# Patient Record
Sex: FEMALE | URBAN - METROPOLITAN AREA
[De-identification: names, ages, dates, MRNs, and addresses within clinical notes are randomized per-mention and may not be internally consistent; named-entity substitution may affect disease eponyms.]

---

## 2020-02-24 ENCOUNTER — APPOINTMENT (RX ONLY)
Dept: URBAN - METROPOLITAN AREA CLINIC 167 | Facility: CLINIC | Age: 85
Setting detail: DERMATOLOGY
End: 2020-02-24

## 2020-02-24 DIAGNOSIS — H61.03 CHONDRITIS OF EXTERNAL EAR: ICD-10-CM

## 2020-02-24 PROBLEM — D48.5 NEOPLASM OF UNCERTAIN BEHAVIOR OF SKIN: Status: ACTIVE | Noted: 2020-02-24

## 2020-02-24 PROCEDURE — ? COUNSELING

## 2020-02-24 PROCEDURE — 69100 BIOPSY OF EXTERNAL EAR: CPT

## 2020-02-24 PROCEDURE — ? BIOPSY BY SHAVE METHOD

## 2020-02-24 ASSESSMENT — LOCATION DETAILED DESCRIPTION DERM: LOCATION DETAILED: RIGHT SUPERIOR HELIX

## 2020-02-24 ASSESSMENT — LOCATION SIMPLE DESCRIPTION DERM: LOCATION SIMPLE: RIGHT EAR

## 2020-02-24 ASSESSMENT — LOCATION ZONE DERM: LOCATION ZONE: EAR

## 2020-02-24 NOTE — PROCEDURE: BIOPSY BY SHAVE METHOD
Detail Level: Detailed
Depth Of Biopsy: dermis
Was A Bandage Applied: Yes
Size Of Lesion In Cm: 0.3
X Size Of Lesion In Cm: 0
Biopsy Type: H and E
Biopsy Method: double edge Personna blade
Anesthesia Volume In Cc (Will Not Render If 0): 0.5
Hemostasis: Pressure
Wound Care: Vaseline
Dressing: bandage
Destruction After The Procedure: No
Type Of Destruction Used: Curettage
Cryotherapy Text: The wound bed was treated with cryotherapy after the biopsy was performed.
Electrodesiccation Text: The wound bed was treated with electrodesiccation after the biopsy was performed.
Electrodesiccation And Curettage Text: The wound bed was treated with electrodesiccation and curettage after the biopsy was performed.
Silver Nitrate Text: The wound bed was treated with silver nitrate after the biopsy was performed.
Lab: [id number]
Lab Facility: [id number]
Consent: Written consent was obtained and risks were reviewed including but not limited to scarring, infection, bleeding, scabbing, incomplete removal, nerve damage and allergy to anesthesia.
Post-Care Instructions: I reviewed with the patient in detail post-care instructions. Patient is to keep the biopsy site dry overnight, and then apply bacitracin twice daily until healed. Patient may apply hydrogen peroxide soaks to remove any crusting.
Notification Instructions: Patient will be notified of biopsy results. However, patient instructed to call the office if not contacted within 2 weeks.
Billing Type: Third-Party Bill

## 2024-08-18 ENCOUNTER — APPOINTMENT (OUTPATIENT)
Dept: GENERAL RADIOLOGY | Facility: HOSPITAL | Age: 89
End: 2024-08-18
Attending: EMERGENCY MEDICINE
Payer: MEDICARE

## 2024-08-18 ENCOUNTER — HOSPITAL ENCOUNTER (INPATIENT)
Facility: HOSPITAL | Age: 89
LOS: 7 days | Discharge: HOME HEALTH | End: 2024-08-25
Attending: EMERGENCY MEDICINE | Admitting: INTERNAL MEDICINE
Payer: MEDICARE

## 2024-08-18 ENCOUNTER — APPOINTMENT (OUTPATIENT)
Dept: CT IMAGING | Facility: HOSPITAL | Age: 89
End: 2024-08-18
Attending: EMERGENCY MEDICINE
Payer: MEDICARE

## 2024-08-18 DIAGNOSIS — R00.0 SINUS TACHYCARDIA: ICD-10-CM

## 2024-08-18 DIAGNOSIS — D64.9 ANEMIA, UNSPECIFIED TYPE: ICD-10-CM

## 2024-08-18 DIAGNOSIS — R11.2 NAUSEA VOMITING AND DIARRHEA: Primary | ICD-10-CM

## 2024-08-18 DIAGNOSIS — R63.0 POOR APPETITE: ICD-10-CM

## 2024-08-18 DIAGNOSIS — R73.9 HYPERGLYCEMIA: ICD-10-CM

## 2024-08-18 DIAGNOSIS — J90 PLEURAL EFFUSION: ICD-10-CM

## 2024-08-18 DIAGNOSIS — E87.20 LACTIC ACIDOSIS: ICD-10-CM

## 2024-08-18 DIAGNOSIS — R19.7 NAUSEA VOMITING AND DIARRHEA: Primary | ICD-10-CM

## 2024-08-18 DIAGNOSIS — E86.0 DEHYDRATION: ICD-10-CM

## 2024-08-18 DIAGNOSIS — C79.89 MALIGNANT NEOPLASM METASTATIC TO OTHER SITE (HCC): ICD-10-CM

## 2024-08-18 DIAGNOSIS — R74.01 TRANSAMINITIS: ICD-10-CM

## 2024-08-18 DIAGNOSIS — R11.2 NAUSEA AND VOMITING, UNSPECIFIED VOMITING TYPE: ICD-10-CM

## 2024-08-18 DIAGNOSIS — D75.839 THROMBOCYTOSIS: ICD-10-CM

## 2024-08-18 LAB
ALBUMIN SERPL-MCNC: 3.9 G/DL (ref 3.2–4.8)
ALBUMIN/GLOB SERPL: 1.2 {RATIO} (ref 1–2)
ALP LIVER SERPL-CCNC: 790 U/L
ALT SERPL-CCNC: 81 U/L
ANION GAP SERPL CALC-SCNC: 9 MMOL/L (ref 0–18)
APTT PPP: 25.7 SECONDS (ref 23–36)
AST SERPL-CCNC: 108 U/L (ref ?–34)
ATRIAL RATE: 123 BPM
BASOPHILS # BLD AUTO: 0 X10(3) UL (ref 0–0.2)
BASOPHILS NFR BLD AUTO: 0 %
BILIRUB SERPL-MCNC: 0.5 MG/DL (ref 0.2–1.1)
BILIRUB UR QL STRIP.AUTO: NEGATIVE
BUN BLD-MCNC: 22 MG/DL (ref 9–23)
CALCIUM BLD-MCNC: 9.1 MG/DL (ref 8.7–10.4)
CHLORIDE SERPL-SCNC: 101 MMOL/L (ref 98–112)
CLARITY UR REFRACT.AUTO: CLEAR
CO2 SERPL-SCNC: 25 MMOL/L (ref 21–32)
COLOR UR AUTO: YELLOW
CREAT BLD-MCNC: 0.77 MG/DL
EGFRCR SERPLBLD CKD-EPI 2021: 74 ML/MIN/1.73M2 (ref 60–?)
EOSINOPHIL # BLD AUTO: 0 X10(3) UL (ref 0–0.7)
EOSINOPHIL NFR BLD AUTO: 0 %
ERYTHROCYTE [DISTWIDTH] IN BLOOD BY AUTOMATED COUNT: 14.7 %
EST. AVERAGE GLUCOSE BLD GHB EST-MCNC: 117 MG/DL (ref 68–126)
GLOBULIN PLAS-MCNC: 3.2 G/DL (ref 2–3.5)
GLUCOSE BLD-MCNC: 171 MG/DL (ref 70–99)
GLUCOSE UR STRIP.AUTO-MCNC: 100 MG/DL
HBA1C MFR BLD: 5.7 % (ref ?–5.7)
HCT VFR BLD AUTO: 30.2 %
HGB BLD-MCNC: 10.9 G/DL
HYALINE CASTS #/AREA URNS AUTO: PRESENT /LPF
IMM GRANULOCYTES # BLD AUTO: 0.04 X10(3) UL (ref 0–1)
IMM GRANULOCYTES NFR BLD: 0.5 %
INR BLD: 0.99 (ref 0.8–1.2)
KETONES UR STRIP.AUTO-MCNC: 40 MG/DL
LACTATE SERPL-SCNC: 1.6 MMOL/L (ref 0.5–2)
LACTATE SERPL-SCNC: 2.7 MMOL/L (ref 0.5–2)
LEUKOCYTE ESTERASE UR QL STRIP.AUTO: NEGATIVE
LIPASE SERPL-CCNC: 100 U/L (ref 12–53)
LYMPHOCYTES # BLD AUTO: 0.33 X10(3) UL (ref 1–4)
LYMPHOCYTES NFR BLD AUTO: 3.9 %
MCH RBC QN AUTO: 33.7 PG (ref 26–34)
MCHC RBC AUTO-ENTMCNC: 36.1 G/DL (ref 31–37)
MCV RBC AUTO: 93.5 FL
MONOCYTES # BLD AUTO: 0.16 X10(3) UL (ref 0.1–1)
MONOCYTES NFR BLD AUTO: 1.9 %
NEUTROPHILS # BLD AUTO: 7.92 X10 (3) UL (ref 1.5–7.7)
NEUTROPHILS # BLD AUTO: 7.92 X10(3) UL (ref 1.5–7.7)
NEUTROPHILS NFR BLD AUTO: 93.7 %
NITRITE UR QL STRIP.AUTO: NEGATIVE
OSMOLALITY SERPL CALC.SUM OF ELEC: 287 MOSM/KG (ref 275–295)
P AXIS: 85 DEGREES
P-R INTERVAL: 166 MS
PH UR STRIP.AUTO: 6 [PH] (ref 5–8)
PLATELET # BLD AUTO: 467 10(3)UL (ref 150–450)
POTASSIUM SERPL-SCNC: 3.7 MMOL/L (ref 3.5–5.1)
PROT SERPL-MCNC: 7.1 G/DL (ref 5.7–8.2)
PROT UR STRIP.AUTO-MCNC: 200 MG/DL
PROTHROMBIN TIME: 13.1 SECONDS (ref 11.6–14.8)
Q-T INTERVAL: 304 MS
QRS DURATION: 74 MS
QTC CALCULATION (BEZET): 435 MS
R AXIS: 57 DEGREES
RBC # BLD AUTO: 3.23 X10(6)UL
RBC UR QL AUTO: NEGATIVE
SODIUM SERPL-SCNC: 135 MMOL/L (ref 136–145)
SP GR UR STRIP.AUTO: >1.03 (ref 1–1.03)
T AXIS: 18 DEGREES
UROBILINOGEN UR STRIP.AUTO-MCNC: NORMAL MG/DL
VENTRICULAR RATE: 123 BPM
WBC # BLD AUTO: 8.5 X10(3) UL (ref 4–11)

## 2024-08-18 PROCEDURE — 71045 X-RAY EXAM CHEST 1 VIEW: CPT | Performed by: EMERGENCY MEDICINE

## 2024-08-18 PROCEDURE — 74177 CT ABD & PELVIS W/CONTRAST: CPT | Performed by: EMERGENCY MEDICINE

## 2024-08-18 PROCEDURE — 99223 1ST HOSP IP/OBS HIGH 75: CPT | Performed by: HOSPITALIST

## 2024-08-18 RX ORDER — LEVOTHYROXINE SODIUM 100 UG/1
100 TABLET ORAL
COMMUNITY

## 2024-08-18 RX ORDER — DEXTROSE, SODIUM CHLORIDE, SODIUM LACTATE, POTASSIUM CHLORIDE, AND CALCIUM CHLORIDE 5; .6; .31; .03; .02 G/100ML; G/100ML; G/100ML; G/100ML; G/100ML
100 INJECTION, SOLUTION INTRAVENOUS CONTINUOUS
Status: DISCONTINUED | OUTPATIENT
Start: 2024-08-18 | End: 2024-08-18

## 2024-08-18 RX ORDER — FOLIC ACID 1 MG/1
1 TABLET ORAL DAILY
Status: DISCONTINUED | OUTPATIENT
Start: 2024-08-18 | End: 2024-08-25

## 2024-08-18 RX ORDER — SODIUM PHOSPHATE, DIBASIC AND SODIUM PHOSPHATE, MONOBASIC 7; 19 G/230ML; G/230ML
1 ENEMA RECTAL ONCE AS NEEDED
Status: DISCONTINUED | OUTPATIENT
Start: 2024-08-18 | End: 2024-08-25

## 2024-08-18 RX ORDER — PANTOPRAZOLE SODIUM 40 MG/1
40 TABLET, DELAYED RELEASE ORAL
Status: DISCONTINUED | OUTPATIENT
Start: 2024-08-19 | End: 2024-08-19

## 2024-08-18 RX ORDER — ACETAMINOPHEN 500 MG
1000 TABLET ORAL EVERY 4 HOURS PRN
Status: DISCONTINUED | OUTPATIENT
Start: 2024-08-18 | End: 2024-08-25

## 2024-08-18 RX ORDER — FOLIC ACID 1 MG/1
TABLET ORAL DAILY
COMMUNITY

## 2024-08-18 RX ORDER — LEVOTHYROXINE SODIUM 100 UG/1
100 TABLET ORAL
Status: DISCONTINUED | OUTPATIENT
Start: 2024-08-18 | End: 2024-08-25

## 2024-08-18 RX ORDER — MORPHINE SULFATE 4 MG/ML
4 INJECTION, SOLUTION INTRAMUSCULAR; INTRAVENOUS ONCE
Status: COMPLETED | OUTPATIENT
Start: 2024-08-18 | End: 2024-08-18

## 2024-08-18 RX ORDER — ONDANSETRON 2 MG/ML
4 INJECTION INTRAMUSCULAR; INTRAVENOUS ONCE
Status: COMPLETED | OUTPATIENT
Start: 2024-08-18 | End: 2024-08-18

## 2024-08-18 RX ORDER — METOCLOPRAMIDE HYDROCHLORIDE 5 MG/ML
10 INJECTION INTRAMUSCULAR; INTRAVENOUS EVERY 8 HOURS PRN
Status: DISCONTINUED | OUTPATIENT
Start: 2024-08-18 | End: 2024-08-25

## 2024-08-18 RX ORDER — ONDANSETRON 2 MG/ML
4 INJECTION INTRAMUSCULAR; INTRAVENOUS EVERY 6 HOURS PRN
Status: DISCONTINUED | OUTPATIENT
Start: 2024-08-18 | End: 2024-08-25

## 2024-08-18 RX ORDER — SENNOSIDES 8.6 MG
17.2 TABLET ORAL NIGHTLY PRN
Status: DISCONTINUED | OUTPATIENT
Start: 2024-08-18 | End: 2024-08-25

## 2024-08-18 RX ORDER — ECHINACEA PURPUREA EXTRACT 125 MG
1 TABLET ORAL
Status: DISCONTINUED | OUTPATIENT
Start: 2024-08-18 | End: 2024-08-25

## 2024-08-18 RX ORDER — ENOXAPARIN SODIUM 100 MG/ML
40 INJECTION SUBCUTANEOUS DAILY
Status: DISCONTINUED | OUTPATIENT
Start: 2024-08-18 | End: 2024-08-19

## 2024-08-18 RX ORDER — POLYETHYLENE GLYCOL 3350 17 G/17G
17 POWDER, FOR SOLUTION ORAL DAILY PRN
Status: DISCONTINUED | OUTPATIENT
Start: 2024-08-18 | End: 2024-08-25

## 2024-08-18 RX ORDER — MELATONIN
3 NIGHTLY PRN
Status: DISCONTINUED | OUTPATIENT
Start: 2024-08-18 | End: 2024-08-25

## 2024-08-18 RX ORDER — BISACODYL 10 MG
10 SUPPOSITORY, RECTAL RECTAL
Status: DISCONTINUED | OUTPATIENT
Start: 2024-08-18 | End: 2024-08-25

## 2024-08-18 RX ORDER — DEXTROSE, SODIUM CHLORIDE, SODIUM LACTATE, POTASSIUM CHLORIDE, AND CALCIUM CHLORIDE 5; .6; .31; .03; .02 G/100ML; G/100ML; G/100ML; G/100ML; G/100ML
100 INJECTION, SOLUTION INTRAVENOUS CONTINUOUS
Status: ACTIVE | OUTPATIENT
Start: 2024-08-18 | End: 2024-08-18

## 2024-08-18 NOTE — ED QUICK NOTES
Patient reports her pain has significantly improved and here nausea has improved. Per patient she is comfortable at this time.

## 2024-08-18 NOTE — ED INITIAL ASSESSMENT (HPI)
States she has had n/v/d since yesterday.  Pain in lower abd.  Pt has just recently had surgery over the past 5 weeks to repair an ulcer and during surgery they found her breast cancer had spread to her liver. Denies any blood in stool or emesis. States she just wants relief from the vomiting.

## 2024-08-18 NOTE — ED QUICK NOTES
Orders for admission, patient is aware of plan and ready to go upstairs. Any questions, please call ED RN Guillermina at extension [phone].     Patient Covid vaccination status: Unvaccinated     COVID Test Ordered in ED: None    COVID Suspicion at Admission: N/A    Running Infusions:      Mental Status/LOC at time of transport: A&Ox4    Other pertinent information:   CIWA score: N/A   NIH score:  N/A

## 2024-08-18 NOTE — PLAN OF CARE
Admitted this 88y/o female w/ chief complaints of nausea,vomiting & having frequent soft/loose stools at home. PMHx includes recent repair of perforated duodenal ulcer, thyroid dse, R mastectomy,R breast cancer ,currently on palliative chemo,hyperlipidemia.  Recent findings of liver mets.IV antibiotics for possible enterocolitis & poss PNA was given in ED, IV fluids & antiemetics. Patient denies having pain & nause upon transfer from ED. She was placed comfortably in bed.Admission data completed. Patient & family updated of plan of care.Oriented to room & unit set up.Call light placed in reach.

## 2024-08-18 NOTE — ED QUICK NOTES
Pt is resting calmly in bed. Family rpts that the pt is more comfortable. Family updated on POC. Pt is waiting to go to CT.

## 2024-08-18 NOTE — ED PROVIDER NOTES
Patient Seen in: [facility] Emergency Department      History     Chief Complaint   Patient presents with    Vomiting     Stated Complaint: VOMITING    Subjective:   89-year-old female, presents with nausea and vomiting.  Patient states the past month or so she was admitted to [facility] for nausea and vomiting, discovered that she has a metastatic liver mass from her known stage IV breast cancer.  Says she also had a duodenal ulcer perforation required surgery for that.  States she was doing okay until last night when started getting really nauseous and cannot keep much p.o. down.  States she has had some loose stools with nausea and vomiting all night and came in for evaluation.  No chest pain cough or shortness of breath.  No blood in her stools.  No dysuria hematuria.  No fevers.  Patient did take oral forecasted yesterday.  This is for low levels.  Also had her last heparin subcu injections yesterday which she has been on since her surgery about 6 weeks ago    EPIC MS NOTE:  Andree Joshi is a 89YO female Patient was admitted 7/10/2024 for abdominal pain, Nausea and inability to tolerate po.  This was felt to be due to hepatic metastasis so she was being treated for this and Hyponatremia.  On 7/20/24 she had severe abdominal pain and was found to have a perforated dudodenal ulcer.  She was taken to the OR on 7/20/24 for Jeff Patch repair of perforated ulcer.  Postoperative course was complicated by slow return of bowel function.  Treatment included Surgery, IV hydration, diet advancement, IV/PO analgesics and antiemetics, DVT and PUD prophylaxis.   Patient discharged to home on 7/30/24 .  At time of discharge, she is hemodynamically stable, afebrile, tolerating a gen diet, ambulating, voiding spontaneously, and pain is controlled on oral medications.  Patient will follow up with Dr. Dobbins in 2 weeks.            Objective:   Past Medical History:    Cancer (HCC)    Hyperlipidemia    Thyroid  disease              Past Surgical History:   Procedure Laterality Date    Abdominal surgery      Mastectomy                  Social History     Socioeconomic History    Marital status:    Tobacco Use    Smoking status: Never    Smokeless tobacco: Never   Vaping Use    Vaping status: Never Used   Substance and Sexual Activity    Alcohol use: Not Currently    Drug use: Never     Social Determinants of Health     Financial Resource Strain: Low Risk  (7/27/2024)    Received from [facility]    Overall Financial Resource Strain (CARDIA)     Difficulty of Paying Living Expenses: Not hard at all   Food Insecurity: No Food Insecurity (7/27/2024)    Received from [facility]    Hunger Vital Sign     Worried About Running Out of Food in the Last Year: Never true     Ran Out of Food in the Last Year: Never true   Transportation Needs: No Transportation Needs (7/27/2024)    Received from [facility]    PRAPARE - Transportation     Lack of Transportation (Medical): No     Lack of Transportation (Non-Medical): No   Physical Activity: Sufficiently Active (11/29/2022)    Received from [facility]    Exercise Vital Sign     Days of Exercise per Week: 7 days     Minutes of Exercise per Session: 90 min   Stress: No Stress Concern Present (11/29/2022)    Received from [facility]    [place] citizen [place] of Occupational [employer] - Occupational Stress Questionnaire     Feeling of Stress : Only a little   Social Connections: Moderately Integrated (11/29/2022)    Received from [facility]    Social Connection and Isolation Panel [NHANES]     Frequency of Communication with Friends and Family: More than three times a week     Frequency of Social Gatherings with Friends and Family: More than three times a week     Attends Jain Services: Never     Active Member of Clubs or Organizations: Yes     Attends Club or Organization Meetings: More than 4 times per year      Marital Status:    Housing Stability: Unknown (7/27/2024)    Received from [facility]    Housing Stability Vital Sign     Unable to Pay for Housing in the Last Year: Patient declined     Number of Times Moved in the Last Year: 1     Homeless in the Last Year: No              Review of Systems   Constitutional:  Negative for fever.   Respiratory:  Negative for shortness of breath.    Cardiovascular:  Negative for chest pain.   Gastrointestinal:  Positive for abdominal pain, diarrhea, nausea and vomiting.   Genitourinary:  Negative for dysuria.   Musculoskeletal:  Positive for back pain.   Neurological:  Negative for dizziness and headaches.       Positive for stated Chief Complaint: Vomiting    Other systems are as noted in HPI.  Constitutional and vital signs reviewed.      All other systems reviewed and negative except as noted above.    Physical Exam     ED Triage Vitals [08/18/24 0602]   BP (!) 172/79   Pulse 117   Resp 20   Temp 98.9 °F (37.2 °C)   Temp src Temporal   SpO2 92 %   O2 Device None (Room air)       Current Vitals:   Vital Signs  BP: 125/71  Pulse: 94  Resp: (!) 28  Temp: 98.9 °F (37.2 °C)  Temp src: Temporal  MAP (mmHg): 88    Oxygen Therapy  SpO2: 93 %  O2 Device: Nasal cannula  O2 Flow Rate (L/min): 2 L/min            Physical Exam  Vitals and nursing note reviewed.   HENT:      Head: Normocephalic.      Nose: Nose normal.      Mouth/Throat:      Mouth: Mucous membranes are moist.   Cardiovascular:      Rate and Rhythm: Tachycardia present. Rhythm irregular.      Pulses: Normal pulses.   Pulmonary:      Effort: Pulmonary effort is normal. No respiratory distress.      Breath sounds: Normal breath sounds.   Abdominal:      General: Bowel sounds are normal. There is no distension.      Palpations: Abdomen is soft.      Comments: Mild diffuse tenderness without rebound or guarding.  Ex lap scar   Musculoskeletal:      Cervical back: Neck supple.   Skin:     General:  Skin is warm and dry.   Neurological:      General: No focal deficit present.      Mental Status: She is alert.   Psychiatric:         Mood and Affect: Mood normal.               ED Course     Labs Reviewed   COMP METABOLIC PANEL (14) - Abnormal; Notable for the following components:       Result Value    Glucose 171 (*)     Sodium 135 (*)      (*)     ALT 81 (*)     Alkaline Phosphatase 790 (*)     All other components within normal limits   CBC WITH DIFFERENTIAL WITH PLATELET - Abnormal; Notable for the following components:    RBC 3.23 (*)     HGB 10.9 (*)     HCT 30.2 (*)     .0 (*)     Neutrophil Absolute Prelim 7.92 (*)     Neutrophil Absolute 7.92 (*)     Lymphocyte Absolute 0.33 (*)     All other components within normal limits   LACTIC ACID, PLASMA - Abnormal; Notable for the following components:    Lactic Acid 2.7 (*)     All other components within normal limits   LIPASE - Abnormal; Notable for the following components:    Lipase 100 (*)     All other components within normal limits   PROTHROMBIN TIME (PT) - Normal   PTT, ACTIVATED - Normal   URINALYSIS WITH CULTURE REFLEX   LACTIC ACID REFLEX POST POSTIVE   BLOOD CULTURE   BLOOD CULTURE   C. DIFFICILE(TOXIGENIC)PCR   GI STOOL PANEL BY PCR     EKG    Rate, intervals and axes as noted on EKG Report.  Rate: 123  Rhythm: Sinus Rhythm  Reading: EKG sinus tachycardia 123 bpm.  Normal axis.  Sinus arrhythmia noted.  No ST elevations.  No previous EKGs to view to compare to    Patient placed on cardiac monitor for telemetry monitoring secondary to abd pain, n/v. Interpretation at bedside by me is sinus tachycardia.                        MDM      CT ABDOMEN+PELVIS(CONTRAST ONLY)(CPT=74177)    Result Date: 8/18/2024  CONCLUSION:   1. Numerous scattered hepatic masses noted which is concerning for metastatic disease.  The largest mass spans the left and right hepatic lobes measuring up to 10.2 x 7.3 cm.  2. There is diffuse wall thickening of the  large and small bowel with underlying nonspecific enterocolitis or other etiologies not entirely excluded.  Mild-to-moderate diffuse abdominal/pelvic ascites.  No free air.  3. Atelectasis/consolidation in the lower lungs with underlying pneumonia not excluded.  Clinical correlation recommended.  Partially imaged bilateral pleural effusions.  4. Cholelithiasis.  5. Anasarca.   Please see above for further details.  Case discussed with Dr. Fernandez at 0804 hours on 8/18/2024.  LOCATION:  [facility]   Dictated by (CST): Papito Oneal MD on 8/18/2024 at 7:54 AM     Finalized by (CST): Papito Oneal MD on 8/18/2024 at 8:07 AM       XR CHEST AP PORTABLE  (CPT=71045)    Result Date: 8/18/2024  CONCLUSION:  Small left pleural effusion.  Atelectasis/consolidation in the left lower lobe with underlying pneumonia not excluded.  Clinical correlation and follow-up suggested.  Mild atelectasis/scarring at the right lung base.    LOCATION:  [facility]      Dictated by (CST): Papito Oneal MD on 8/18/2024 at 7:07 AM     Finalized by (CST): Papito Oneal MD on 8/18/2024 at 7:08 AM        I independent interpreted the CT abdomen pelvis without any obvious signs of obstructive process    Family at bedside helpful to provide information on the history presenting illness    External chart review demonstrates her recent admission to [place] for her duodenal perforation, nausea and vomiting    Differential diagnosis includes, but not limited to, infection, dehydration, metastatic disease, bowel perforation, obstruction    89-year-old female with nausea and vomiting.  Tachycardic care upon arrival.  Has bilateral pleural effusions, ascites and anasarca.  Broad-spectrum antibiotic coverage.  Stool cultures ordered and pending.  Should her pain and nausea and vomiting controlled with morphine and Zofran here.  Admitted Dr. Jaime, awaiting bed assignment.  Heart rate improved.  Blood pressure improved.          Admission disposition:  8/18/2024  9:48 AM                                     [facility]   part of [facility]      Sepsis Reassessment Note    BP (!) 172/79   Pulse 117   Temp 98.9 °F (37.2 °C) (Temporal)   Resp 20   Ht 165.1 cm (5' 5\")   Wt 57.6 kg   SpO2 92%   BMI 21.13 kg/m²      I completed the sepsis reassessment at 0920    Cardiac:  Regularity: Regular  Rate: Tachycardic  Heart Sounds: S1,S2    Lungs:   Right: Clear  Left: Clear    Peripheral Pulses:  Radial: Right 1+ or Left 1+      Capillary Refill:  <3 Secs    Skin:  Temp/Moisture: Warm and Dry  Color: Normal      Sd Fernandez DO  8/18/2024  6:43 AM            Medical Decision Making      Disposition and Plan     Clinical Impression:  1. Nausea vomiting and diarrhea    2. Lactic acidosis    3. Malignant neoplasm metastatic to other site (HCC)    4. Pleural effusion    5. Sinus tachycardia    6. Dehydration    7. Transaminitis    8. Hyperglycemia    9. Thrombocytosis    10. Anemia, unspecified type         Disposition:  Admit  8/18/2024  9:48 am    Follow-up:  No follow-up provider specified.        Medications Prescribed:  Current Discharge Medication List                            Hospital Problems       Present on Admission             ICD-10-CM Noted POA    * (Principal) Nausea vomiting and diarrhea R11.2, R19.7 8/18/2024 Unknown

## 2024-08-18 NOTE — H&P
[facility]     Andree Joshi Patient Status:  Inpatient    3/1/1935 MRN [id number]   Location [facility] 4NW-A Attending Joel Jaime MD   Hosp Day # 0 PCP Gena Rivas     Chief Complaint:   Chief Complaint   Patient presents with    Vomiting       Subjective:    History of Present Illness:     Andree Joshi is a 89 year old female with a past medical history of osteoporosis, hypothyroidism, hyperlipidemia and right breast cancer.  She has stage IV breast cancer and is currently on palliative therapy.  She was recently admitted at an outside hospital secondary to nausea vomiting and at that time was found to have liver mets.  She also recently had a duodenal ulcer perforation that required surgery.  She has been on a PPI.  She now comes to the emergency room secondary to significant nausea and some vomiting.  She states that she is also had some loose stool.  She denies any other chest pain or shortness of breath at this time.  She recently completed her subcutaneous heparin injections as she was on secondary to her surgery which is about 6 weeks ago.  In emergency room she had an abdominal CT scan that showed hepatic mets and wall thickening in the large and small bowel with possible enterocolitis.  She also has signs of possible low lung pneumonia.  Lactic acid was elevated.    History/Other:    Past Medical History:  Past Medical History:    Cancer (HCC)    History of stomach ulcers    Hyperlipidemia    Thyroid disease     Past Surgical History:   Past Surgical History:   Procedure Laterality Date    Abdominal surgery      Mastectomy      Mastectomy right        Family History:   Family History   Problem Relation Age of Onset    Other (Other) Father     Cancer Father      Social History:    reports that she has never smoked. She has never used smokeless tobacco. She reports that she does not currently use alcohol. She reports that she does not use drugs.      Allergies: No Known Allergies    Medications:    No current facility-administered medications on file prior to encounter.     Current Outpatient Medications on File Prior to Encounter   Medication Sig Dispense Refill    levothyroxine 100 MCG Oral Tab Take 1 tablet (100 mcg total) by mouth before breakfast.      folic acid 1 MG Oral Tab Take by mouth daily.         Review of Systems:   A comprehensive review of systems was completed.    Pertinent positives and negatives noted in the HPI.    Objective:   Physical Exam:    /71   Pulse 94   Temp 98.9 °F (37.2 °C) (Temporal)   Resp (!) 28   Ht 5' 5\" (1.651 m)   Wt 127 lb (57.6 kg)   SpO2 93%   BMI 21.13 kg/m²   General: No acute distress, Alert  Respiratory: mild basilar rhonchi, no wheezes  Cardiovascular: S1, S2.   Abdomen: Soft, non tender, +BS  Neuro: No new focal deficits  Extremities: No edema      Results:    Labs:      Labs Last 24 Hours:  Recent Labs   Lab 08/18/24  0631   WBC 8.5   HGB 10.9*   MCV 93.5   .0*   INR 0.99       Recent Labs   Lab 08/18/24  0631   *   BUN 22   CREATSERUM 0.77   CA 9.1   ALB 3.9   *   K 3.7      CO2 25.0   ALKPHO 790*   *   ALT 81*   BILT 0.5   TP 7.1       Estimated Creatinine Clearance: 45.4 mL/min (based on SCr of 0.77 mg/dL).    No results for input(s): \"TROP\", \"TROPHS\", \"CK\" in the last 168 hours.    Recent Labs   Lab 08/18/24  0631   PTP 13.1   INR 0.99       No results for input(s): \"TROP\", \"CK\" in the last 168 hours.      Imaging: Imaging data reviewed in Epic.    Assessment & Plan:      #Sepsis  BCX  Minimize IVF given risk of fluid OL    #PNA  IV ABX    #N/V  Possible enterocolitis  Minimal diarrhea  R/o c. Diff  GI stool panel  Supportive care  CLD for now    #Metastatic Stage IV BRCA  Follows with onc as OP  On palliative therapy    #Hyperglycemia  Likely stress/infection related  A1c    #Transaminase elevation  Due to liver  mets  monitor    #Hypothyroid    #AOCD    #Recent perforated duodenal ulcer/PUD  Resume PPI      All diagnosis' and recommendations discussed with patient and/or family in detail.      Plan of care discussed with ED physician      Joel Jaime MD    Supplementary Documentation:     The 21st Century Cures Act makes medical notes like these available to patients in the interest of transparency. Please be advised this is a medical document. Medical documents are intended to carry relevant information, facts as evident, and the clinical opinion of the practitioner. The medical note is intended as peer to peer communication and may appear blunt or direct. It is written in medical language and may contain abbreviations or verbiage that are unfamiliar.               **Certification      PHYSICIAN Certification of Need for Inpatient Hospitalization - Initial Certification    Patient will require inpatient services that will reasonably be expected to span two midnight's based on the clinical documentation in H+P.   Based on patients current state of illness, I anticipate that, after discharge, patient will require TBD.

## 2024-08-18 NOTE — ED QUICK NOTES
Patient sating 87% on room air, patient no resp distress. Patient placed on 2L nc and sating 95%.

## 2024-08-19 ENCOUNTER — ANESTHESIA EVENT (OUTPATIENT)
Dept: ENDOSCOPY | Facility: HOSPITAL | Age: 89
End: 2024-08-19
Payer: MEDICARE

## 2024-08-19 ENCOUNTER — APPOINTMENT (OUTPATIENT)
Dept: MRI IMAGING | Facility: HOSPITAL | Age: 89
End: 2024-08-19
Attending: INTERNAL MEDICINE
Payer: MEDICARE

## 2024-08-19 DIAGNOSIS — R11.2 NAUSEA AND VOMITING, UNSPECIFIED VOMITING TYPE: Primary | ICD-10-CM

## 2024-08-19 PROBLEM — K29.00 ACUTE GASTRITIS WITHOUT HEMORRHAGE: Status: ACTIVE | Noted: 2024-08-19

## 2024-08-19 PROBLEM — Z17.0 MALIGNANT NEOPLASM OF BREAST IN FEMALE, ESTROGEN RECEPTOR POSITIVE (HCC): Status: ACTIVE | Noted: 2024-08-19

## 2024-08-19 PROBLEM — J15.9 PNEUMONIA, BACTERIAL: Status: ACTIVE | Noted: 2024-08-19

## 2024-08-19 PROBLEM — C50.919 MALIGNANT NEOPLASM OF BREAST IN FEMALE, ESTROGEN RECEPTOR POSITIVE (HCC): Status: ACTIVE | Noted: 2024-08-19

## 2024-08-19 LAB
ALBUMIN SERPL-MCNC: 3.1 G/DL (ref 3.2–4.8)
ALBUMIN/GLOB SERPL: 1.3 {RATIO} (ref 1–2)
ALP LIVER SERPL-CCNC: 548 U/L
ALT SERPL-CCNC: 56 U/L
ANION GAP SERPL CALC-SCNC: 3 MMOL/L (ref 0–18)
AST SERPL-CCNC: 76 U/L (ref ?–34)
BASOPHILS # BLD AUTO: 0.01 X10(3) UL (ref 0–0.2)
BASOPHILS # BLD AUTO: 0.01 X10(3) UL (ref 0–0.2)
BASOPHILS NFR BLD AUTO: 0.1 %
BASOPHILS NFR BLD AUTO: 0.1 %
BILIRUB SERPL-MCNC: 0.3 MG/DL (ref 0.2–1.1)
BUN BLD-MCNC: 17 MG/DL (ref 9–23)
CALCIUM BLD-MCNC: 7.8 MG/DL (ref 8.7–10.4)
CHLORIDE SERPL-SCNC: 105 MMOL/L (ref 98–112)
CO2 SERPL-SCNC: 25 MMOL/L (ref 21–32)
CREAT BLD-MCNC: 0.56 MG/DL
EGFRCR SERPLBLD CKD-EPI 2021: 88 ML/MIN/1.73M2 (ref 60–?)
EOSINOPHIL # BLD AUTO: 0 X10(3) UL (ref 0–0.7)
EOSINOPHIL # BLD AUTO: 0 X10(3) UL (ref 0–0.7)
EOSINOPHIL NFR BLD AUTO: 0 %
EOSINOPHIL NFR BLD AUTO: 0 %
ERYTHROCYTE [DISTWIDTH] IN BLOOD BY AUTOMATED COUNT: 14.6 %
ERYTHROCYTE [DISTWIDTH] IN BLOOD BY AUTOMATED COUNT: 15.3 %
GLOBULIN PLAS-MCNC: 2.4 G/DL (ref 2–3.5)
GLUCOSE BLD-MCNC: 155 MG/DL (ref 70–99)
HCT VFR BLD AUTO: 24.9 %
HCT VFR BLD AUTO: 25.1 %
HGB BLD-MCNC: 8.3 G/DL
HGB BLD-MCNC: 8.9 G/DL
IMM GRANULOCYTES # BLD AUTO: 0.06 X10(3) UL (ref 0–1)
IMM GRANULOCYTES # BLD AUTO: 0.06 X10(3) UL (ref 0–1)
IMM GRANULOCYTES NFR BLD: 0.5 %
IMM GRANULOCYTES NFR BLD: 0.5 %
LYMPHOCYTES # BLD AUTO: 0.4 X10(3) UL (ref 1–4)
LYMPHOCYTES # BLD AUTO: 0.5 X10(3) UL (ref 1–4)
LYMPHOCYTES NFR BLD AUTO: 3.4 %
LYMPHOCYTES NFR BLD AUTO: 4.1 %
MAGNESIUM SERPL-MCNC: 1.3 MG/DL (ref 1.6–2.6)
MCH RBC QN AUTO: 33.2 PG (ref 26–34)
MCH RBC QN AUTO: 33.3 PG (ref 26–34)
MCHC RBC AUTO-ENTMCNC: 33.1 G/DL (ref 31–37)
MCHC RBC AUTO-ENTMCNC: 35.7 G/DL (ref 31–37)
MCV RBC AUTO: 100.4 FL
MCV RBC AUTO: 93.3 FL
MONOCYTES # BLD AUTO: 0.49 X10(3) UL (ref 0.1–1)
MONOCYTES # BLD AUTO: 0.5 X10(3) UL (ref 0.1–1)
MONOCYTES NFR BLD AUTO: 4 %
MONOCYTES NFR BLD AUTO: 4.2 %
NEUTROPHILS # BLD AUTO: 10.9 X10 (3) UL (ref 1.5–7.7)
NEUTROPHILS # BLD AUTO: 10.9 X10(3) UL (ref 1.5–7.7)
NEUTROPHILS # BLD AUTO: 11.1 X10 (3) UL (ref 1.5–7.7)
NEUTROPHILS # BLD AUTO: 11.1 X10(3) UL (ref 1.5–7.7)
NEUTROPHILS NFR BLD AUTO: 91.3 %
NEUTROPHILS NFR BLD AUTO: 91.8 %
OSMOLALITY SERPL CALC.SUM OF ELEC: 281 MOSM/KG (ref 275–295)
PLATELET # BLD AUTO: 339 10(3)UL (ref 150–450)
PLATELET # BLD AUTO: 341 10(3)UL (ref 150–450)
POTASSIUM SERPL-SCNC: 3.3 MMOL/L (ref 3.5–5.1)
PROT SERPL-MCNC: 5.5 G/DL (ref 5.7–8.2)
RBC # BLD AUTO: 2.5 X10(6)UL
RBC # BLD AUTO: 2.67 X10(6)UL
SODIUM SERPL-SCNC: 133 MMOL/L (ref 136–145)
WBC # BLD AUTO: 11.9 X10(3) UL (ref 4–11)
WBC # BLD AUTO: 12.2 X10(3) UL (ref 4–11)

## 2024-08-19 PROCEDURE — 99223 1ST HOSP IP/OBS HIGH 75: CPT | Performed by: INTERNAL MEDICINE

## 2024-08-19 PROCEDURE — 99233 SBSQ HOSP IP/OBS HIGH 50: CPT | Performed by: HOSPITALIST

## 2024-08-19 PROCEDURE — 70553 MRI BRAIN STEM W/O & W/DYE: CPT | Performed by: INTERNAL MEDICINE

## 2024-08-19 RX ORDER — MAGNESIUM HYDROXIDE/ALUMINUM HYDROXICE/SIMETHICONE 120; 1200; 1200 MG/30ML; MG/30ML; MG/30ML
30 SUSPENSION ORAL 4 TIMES DAILY PRN
Status: DISCONTINUED | OUTPATIENT
Start: 2024-08-19 | End: 2024-08-25

## 2024-08-19 RX ORDER — POTASSIUM CHLORIDE 1.5 G/1.58G
40 POWDER, FOR SOLUTION ORAL ONCE
Status: COMPLETED | OUTPATIENT
Start: 2024-08-19 | End: 2024-08-19

## 2024-08-19 RX ORDER — AZITHROMYCIN 250 MG/1
500 TABLET, FILM COATED ORAL
Status: COMPLETED | OUTPATIENT
Start: 2024-08-19 | End: 2024-08-20

## 2024-08-19 RX ORDER — GADOTERATE MEGLUMINE 376.9 MG/ML
20 INJECTION INTRAVENOUS
Status: COMPLETED | OUTPATIENT
Start: 2024-08-19 | End: 2024-08-19

## 2024-08-19 RX ORDER — MAGNESIUM OXIDE 400 MG/1
800 TABLET ORAL ONCE
Status: COMPLETED | OUTPATIENT
Start: 2024-08-19 | End: 2024-08-19

## 2024-08-19 NOTE — CONSULTS
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    - recently had prolonged hospitalization with perforated duodenal ulcer s/p exploratory laparotomy and repair on 7/20/24, and needed surgical drains as well  - since discharge reports ongoing n/v, ongoing weight loss  - there was plan to add ribociclib when she recovered more from surgery  - CT A/P with diffuse hepatic metastatic disease; largest mass 10.2x7.3cm. diffuse wall thickening of large and small bowel. Prior CT in 7/2024 noted largest lesion at 9.0x8.8cm.   - living with daughter now in [place], but getting treatment with Dr Desir at [facility]  - denies any abdominal pain  - she is wondering about her decline and is asking appropriate questions about her prognosis. She still takes care of her ADLs but says she has been feeling very weak      Past Medical History:  Past Medical History:    Cancer (HCC)    History of stomach ulcers    Hyperlipidemia    Thyroid disease       Past Surgical History:   Procedure Laterality Date    Abdominal surgery      Mastectomy      Mastectomy right         Home Medications:  No current facility-administered medications on file prior to encounter.     Current Outpatient Medications on File Prior to Encounter   Medication Sig Dispense Refill    levothyroxine 100 MCG Oral Tab Take 1 tablet (100 mcg total) by mouth before breakfast.      folic acid 1 MG Oral Tab Take by mouth daily.         Current Inpatient Medications:  Inpatient Meds:   pantoprazole  40 mg Intravenous Q12H    azithromycin   500 mg Oral Daily    folic acid  1 mg Oral Daily    levothyroxine  100 mcg Oral Before breakfast    ampicillin-sulbactam  3 g Intravenous q6h      potassium chloride 10 mEq in dextrose 5%-sodium chloride 0.9% 1,000 mL infusion 50 mL/hr at 08/19/24 1036       PRN Meds:    alum-mag hydroxide-simethicone    acetaminophen    melatonin    glycerin-hypromellose-    sodium chloride    ondansetron    metoclopramide    polyethylene glycol (PEG 3350)    sennosides    bisacodyl    fleet enema    Allergies:   No Known Allergies    Psychosocial History:  Social History     Social History Narrative    Not on file     Social History     Socioeconomic History    Marital status:    Tobacco Use    Smoking status: Never    Smokeless tobacco: Never   Vaping Use    Vaping status: Never Used   Substance and Sexual Activity    Alcohol use: Not Currently    Drug use: Never     Social Determinants of Health     Financial Resource Strain: Low Risk  (7/27/2024)    Received from [facility]    Overall Financial Resource Strain (CARDIA)     Difficulty of Paying Living Expenses: Not hard at all   Food Insecurity: No Food Insecurity (8/18/2024)    Food Insecurity     Food Insecurity: Never true   Transportation Needs: No Transportation Needs (8/18/2024)    Transportation Needs     Lack of Transportation: No   Physical Activity: Sufficiently Active (11/29/2022)    Received from [facility]    Exercise Vital Sign     Days of Exercise per Week: 7 days     Minutes of Exercise per Session: 90 min   Stress: No Stress Concern Present (11/29/2022)    Received from [facility]    [place] [employer] - Occupational Stress Questionnaire     Feeling of Stress : Only a little   Social Connections: Moderately Integrated (11/29/2022)    Received from [facility]    Social Connection and Isolation Panel [NHANES]     Frequency of Communication with Friends and Family: More than three times a week      Frequency of Social Gatherings with Friends and Family: More than three times a week     Attends Sikhism Services: Never     Active Member of Clubs or Organizations: Yes     Attends Club or Organization Meetings: More than 4 times per year     Marital Status:    Housing Stability: Low Risk  (8/18/2024)    Housing Stability     Housing Instability: No       Family Medical History:  Family History   Problem Relation Age of Onset    Other (Other) Father     Cancer Father        Review of Systems:  A 10-point ROS was done with pertinent positives and negative per the HPI    Vital Signs:  Height: --  Weight: --  BSA (Calculated - sq m): --  Pulse: 94 (08/19 0530)  BP: 141/56 (08/19 0530)  Temp: 98.4 °F (36.9 °C) (08/19 1523)  Do Not Use - Resp Rate: --  SpO2: 96 % (08/19 1523)      Wt Readings from Last 6 Encounters:   08/18/24 57.6 kg (127 lb)       ECOG PS: 1-2    Physical Examination:  General: Patient is alert and oriented, not in acute distress  Psych: Mood and affect are appropriate  Eyes: EOMI, PERRL  ENT: Oropharynx is clear, no adenopathy  CV:no LE edema  Respiratory: Non-labored respirations  GI/Abd: Soft, non-tender, non-distended  Neurological: Grossly intact   Skin: no rashes or petechiae    Laboratory:  Recent Labs   Lab 08/18/24  0631 08/19/24  0615 08/19/24  1255   WBC 8.5 11.9* 12.2*   HGB 10.9* 8.9* 8.3*   HCT 30.2* 24.9* 25.1*   .0* 341.0 339.0   MCV 93.5 93.3 100.4*   RDW 14.7 14.6 15.3   NEPRELIM 7.92* 10.90* 11.10*       Recent Labs   Lab 08/18/24  0631 08/19/24  0615   * 133*   K 3.7 3.3*    105   CO2 25.0 25.0   BUN 22 17   CREATSERUM 0.77 0.56   * 155*   CA 9.1 7.8*   TP 7.1 5.5*   ALB 3.9 3.1*   ALKPHO 790* 548*   * 76*   ALT 81* 56*   BILT 0.5 0.3       Recent Labs   Lab 08/18/24  0631   INR 0.99   PTT 25.7       Impression & Plan:     Metastatic breast cancer to liver  Nausea  - prior treatment history reviewed; had progression on letrozole  - by  report, hepatic disease appears to be stable but no direct comparison by imaging available  - obtain brain MRI to rule out intracranial metastases as cause of n/v  - GI consulted; agree with upper endoscopy to evaluate recent duodenal repair  - if above workup negative, nausea may be SE of fulvestrant vs caused by her hepatic metastatic disease vs enterocolitis. Enterocolitis much less likely caused by fulvestrant  - we discussed her incurable prognosis with palliative intent of treatment. We discussed treatment vs hospice. She is asking appropriate questions about hospice. I discussed philosophy of hospice and ability to do home hospice. Discussed we could proceed with EGD and brain MRI and this would be an ongoing discussion in the setting of her declining functional status with diffuse disease in her liver.    Anemia  - iron studies consistent with anemia of chronic disease. Has folic acid deficiency; continue folic acid 1mg daily    Advance care planning  - we discussed code status. She endorsed DNAR/select. Order entered in chart    Will continue to follow    Olayinka Dejesus MD  Hematology/Medical Oncology  [facility]

## 2024-08-19 NOTE — PLAN OF CARE
Patient is alert and oriented x 4. Family present at the bedside throughout shift. Patient is calm and cooperative with care. No s/s of distress noted. Patient denies pain throughout shift. VSS. On IVF for electrolye/fluid balance as well as IV ABX for infection. Oncology and GI consults placed for patient. EGD with possible biopsy ordered for patient for 8/20. Consent for procedure and anesthesia signed and placed in patient's chart. Clear liquid diet currently, NPO at midnight tonight. Medications given per mar. Patient up to the bathroom with 1 assist for transfers. Continent of bowel and bladder. Safety precautions in place for patient. Call light within reach.    Problem: GASTROINTESTINAL - ADULT  Goal: Minimal or absence of nausea and vomiting  Description: INTERVENTIONS:  - Maintain adequate hydration with IV or PO as ordered and tolerated  - Nasogastric tube to low intermittent suction as ordered  - Evaluate effectiveness of ordered antiemetic medications  - Provide nonpharmacologic comfort measures as appropriate  - Advance diet as tolerated, if ordered  - Obtain nutritional consult as needed  - Evaluate fluid balance  Outcome: Progressing     Problem: GASTROINTESTINAL - ADULT  Goal: Maintains adequate nutritional intake (undernourished)  Description: INTERVENTIONS:  - Monitor percentage of each meal consumed  - Identify factors contributing to decreased intake, treat as appropriate  - Assist with meals as needed  - Monitor I&O, WT and lab values  - Obtain nutritional consult as needed  - Optimize oral hygiene and moisture  - Encourage food from home; allow for food preferences  - Enhance eating environment  Outcome: Not Progressing

## 2024-08-19 NOTE — PLAN OF CARE
A&Ox4, VSS and afebrile  Pt c/o nausea pt was given prn reglan and zofran (last dose @ 0545)  On Clear liquid diet  Safety precaution in place, call light within reach.       Problem: GASTROINTESTINAL - ADULT  Goal: Minimal or absence of nausea and vomiting  Description: INTERVENTIONS:  - Maintain adequate hydration with IV or PO as ordered and tolerated  - Nasogastric tube to low intermittent suction as ordered  - Evaluate effectiveness of ordered antiemetic medications  - Provide nonpharmacologic comfort measures as appropriate  - Advance diet as tolerated, if ordered  - Obtain nutritional consult as needed  - Evaluate fluid balance  Outcome: Progressing

## 2024-08-19 NOTE — ANESTHESIA PREPROCEDURE EVALUATION
PRE-OP EVALUATION    Patient Name: Andree Joshi    Admit Diagnosis: Dehydration [E86.0]  Sinus tachycardia [R00.0]  Lactic acidosis [E87.20]  Pleural effusion [J90]  Transaminitis [R74.01]  Hyperglycemia [R73.9]  Thrombocytosis [D75.839]  Nausea vomiting and diarrhea [R11.2, R19.7]  Malignant neoplasm metastatic to other site (HCC) [C79.89]  Anemia, unspecified type [D64.9]    Pre-op Diagnosis: Nausea and vomiting, unspecified vomiting type [R11.2]    ESOPHAGOGASTRODUODENOSCOPY (EGD)    Anesthesia Procedure: ESOPHAGOGASTRODUODENOSCOPY (EGD)    Surgeons and Role:     * Nelson Perez MD - Primary    Pre-op vitals reviewed.  Temp: 98.4 °F (36.9 °C)  Pulse: 94  Resp: 20  BP: 141/56  SpO2: 96 %  Body mass index is 21.13 kg/m².    Current medications reviewed.  Hospital Medications:   [COMPLETED] magnesium oxide (Mag-Ox) tab 800 mg  800 mg Oral Once    [COMPLETED] potassium chloride (Klor-Con) 20 MEQ oral powder 40 mEq  40 mEq Oral Once    pantoprazole (Protonix) 40 mg in sodium chloride 0.9% PF 10 mL IV push  40 mg Intravenous Q12H    alum-mag hydroxide-simethicone (Maalox) 200-200-20 MG/5ML oral suspension 30 mL  30 mL Oral QID PRN    potassium chloride 10 mEq in dextrose 5%-sodium chloride 0.9% 1,000 mL infusion   Intravenous Continuous    azithromycin (Zithromax) tab 500 mg  500 mg Oral Daily    [COMPLETED] sodium chloride 0.9 % IV bolus 1,728 mL  30 mL/kg Intravenous Once    [COMPLETED] ondansetron (Zofran) 4 MG/2ML injection 4 mg  4 mg Intravenous Once    [COMPLETED] morphINE PF 4 MG/ML injection 4 mg  4 mg Intravenous Once    [COMPLETED] piperacillin-tazobactam (Zosyn) 4.5 g in dextrose 5% 100 mL IVPB-ADDV  4.5 g Intravenous Once    [COMPLETED] iopamidol 76% (ISOVUE-370) injection for power injector  80 mL Intravenous ONCE PRN    folic acid (Folvite) tab 1 mg  1 mg Oral Daily    levothyroxine (Synthroid) tab 100 mcg  100 mcg Oral Before breakfast    acetaminophen (Tylenol Extra Strength) tab 1,000 mg   1,000 mg Oral Q4H PRN    melatonin tab 3 mg  3 mg Oral Nightly PRN    glycerin-hypromellose- (Artificial Tears) 0.2-0.2-1 % ophthalmic solution 1 drop  1 drop Both Eyes QID PRN    sodium chloride (Saline Mist) 0.65 % nasal solution 1 spray  1 spray Each Nare Q3H PRN    ondansetron (Zofran) 4 MG/2ML injection 4 mg  4 mg Intravenous Q6H PRN    metoclopramide (Reglan) 5 mg/mL injection 10 mg  10 mg Intravenous Q8H PRN    polyethylene glycol (PEG 3350) (Miralax) 17 g oral packet 17 g  17 g Oral Daily PRN    sennosides (Senokot) tab 17.2 mg  17.2 mg Oral Nightly PRN    bisacodyl (Dulcolax) 10 MG rectal suppository 10 mg  10 mg Rectal Daily PRN    fleet enema (Fleet) 7-19 GM/118ML rectal enema 133 mL  1 enema Rectal Once PRN    [] dextrose in lactated ringers 5% infusion  100 mL/hr Intravenous Continuous    ampicillin-sulbactam (Unasyn) 3 g in sodium chloride 0.9% 100mL IVPB-ADD  3 g Intravenous q6h       Outpatient Medications:     Medications Prior to Admission   Medication Sig Dispense Refill Last Dose    levothyroxine 100 MCG Oral Tab Take 1 tablet (100 mcg total) by mouth before breakfast.   2024 at 0700    folic acid 1 MG Oral Tab Take by mouth daily.   2024 at 0900       Allergies: Patient has no known allergies.      Anesthesia Evaluation    Patient summary reviewed.    Anesthetic Complications           GI/Hepatic/Renal      (+) GERD          (+) liver disease                 Cardiovascular                  (+) hypertension   (+) hyperlipidemia                                  Endo/Other           (+) hypothyroidism    (+) anemia                   Pulmonary    Negative pulmonary ROS.                       Neuro/Psych    Negative neuro/psych ROS.                          Breast cancer, mets to liver, nodes  Gastritis          Past Surgical History:   Procedure Laterality Date    Abdominal surgery      Mastectomy      Mastectomy right       Social History     Socioeconomic History    Marital  status:    Tobacco Use    Smoking status: Never    Smokeless tobacco: Never   Vaping Use    Vaping status: Never Used   Substance and Sexual Activity    Alcohol use: Not Currently    Drug use: Never     History   Drug Use Unknown     Available pre-op labs reviewed.  Lab Results   Component Value Date    WBC 12.2 (H) 08/19/2024    RBC 2.50 (L) 08/19/2024    HGB 8.3 (L) 08/19/2024    HCT 25.1 (L) 08/19/2024    .4 (H) 08/19/2024    MCH 33.2 08/19/2024    MCHC 33.1 08/19/2024    RDW 15.3 08/19/2024    .0 08/19/2024     Lab Results   Component Value Date     (L) 08/19/2024    K 3.3 (L) 08/19/2024     08/19/2024    CO2 25.0 08/19/2024    BUN 17 08/19/2024    CREATSERUM 0.56 08/19/2024     (H) 08/19/2024    CA 7.8 (L) 08/19/2024     Lab Results   Component Value Date    INR 0.99 08/18/2024         Airway      Mallampati: II  Mouth opening: >3 FB  TM distance: 4 - 6 cm  Neck ROM: full Cardiovascular      Rhythm: regular  Rate: normal     Dental      Dental appliance(s): veneers       Pulmonary    Pulmonary exam normal.                 Other findings  Veneers on upper and lower            ASA: 3   Plan: MAC  NPO status verified and           Plan/risks discussed with: patient and child/children                Present on Admission:  **None**

## 2024-08-19 NOTE — PROGRESS NOTES
[facility]   part of [facility]     Hospitalist Progress Note     Andree Joshi Patient Status:  Inpatient    3/1/1935 MRN [id number]   Location [facility] 4NW-A Attending Joel Jaime MD   Hosp Day # 1 PCP Gena Rivas     Chief Complaint: vomiting    Subjective:     Patient has c/o vomiting.  Difficulty tolerating PO diet    Objective:    Review of Systems:   ROS completed; pertinent positive and negatives stated in subjective.    Vital signs:  Temp:  [97.8 °F (36.6 °C)-98.3 °F (36.8 °C)] 98.2 °F (36.8 °C)  Pulse:  [76-94] 94  Resp:  [18-20] 19  BP: (131-141)/(56-57) 141/56  SpO2:  [94 %-96 %] 95 %    Physical Exam:    General: No acute distress  Respiratory: Clear to auscultation bilaterally  Cardiovascular: S1, S2.  Abdomen: Soft, TTP epigastrium  Neuro: No new focal deficits      Diagnostic Data:    Labs:  Recent Labs   Lab 2431 24  0615   WBC 8.5 11.9*   HGB 10.9* 8.9*   MCV 93.5 93.3   .0* 341.0   INR 0.99  --        Recent Labs   Lab 2431 24  0615   * 155*   BUN 22 17   CREATSERUM 0.77 0.56   CA 9.1 7.8*   ALB 3.9 3.1*   * 133*   K 3.7 3.3*    105   CO2 25.0 25.0   ALKPHO 790* 548*   * 76*   ALT 81* 56*   BILT 0.5 0.3   TP 7.1 5.5*       Estimated Creatinine Clearance: 62.5 mL/min (based on SCr of 0.56 mg/dL).     No results for input(s): \"TROP\", \"TROPHS\", \"CK\" in the last 168 hours.    Recent Labs   Lab 2431   PTP 13.1   INR 0.99              Imaging: Imaging data reviewed in Epic.    Medications:    pantoprazole  40 mg Intravenous Q12H    folic acid  1 mg Oral Daily    levothyroxine  100 mcg Oral Before breakfast    azithromycin  500 mg Intravenous Q24H    ampicillin-sulbactam  3 g Intravenous q6h       Assessment & Plan:     #Sepsis  BCX  Minimize IVF given risk of fluid OL     #PNA  IV ABX     #N/V  Suspect gastritis  Inc PPI to IV BID  Add maalox    #Diarrhea  R/o infection though diarrhea now  resolved    #Anemia  No active  bleeding  Likely dilutional  Monitor    #Metastatic Stage IV BRCA  Follows with onc as OP  On palliative therapy     #Pre DM with stress hyperglycemia  Likely stress/infection related  A1c 5.7     #Transaminase elevation  Due to liver mets  monitor     #Hypothyroid     #AOCD     #Recent perforated duodenal ulcer/PUD  Cont. PPI      Dispo:  discussed with pt and family at bedside in detail. Prefer to not have futher procedrues if possible.  Add maalox and increase PPI.  If still not able to tolerate PO intake later today or if Hgb decreased, will have GI see.         Joel Jaime MD    Addendum:    No sig improvement.  Discussed with GI, eval today for EGD.  NPO @ midnight.     Joel Jaime MD      Supplementary Documentation:   Total time:  51 minutes  Quality:    DVT Mechanical Prophylaxis:     Early ambuation  DVT Pharmacologic Prophylaxis   Medication   None                Code Status: Not on file  Mandujano: No urinary catheter in place  Mandujano Duration (in days):   Central line:    NURY:       Discharge is dependent on: clinical stability  At this point Ms. Joshi is expected to be discharge to: home    The 21st Century Cures Act makes medical notes like these available to patients in the interest of transparency. Please be advised this is a medical document. Medical documents are intended to carry relevant information, facts as evident, and the clinical opinion of the practitioner. The medical note is intended as peer to peer communication and may appear blunt or direct. It is written in medical language and may contain abbreviations or verbiage that are unfamiliar.

## 2024-08-19 NOTE — CM/SW NOTE
08/19/24 1200   CM/SW Referral Data   Referral Source Social Work (self-referral)   Reason for Referral Discharge planning   Informant EMR   Patient Info   Patient's Home Environment House   Discharge Needs   Anticipated D/C needs Home health care     SW received BPA for Home Health services. Upon completing chart review, patient appears to be current with [facility]. These services were arranged by [facility] at the end of July.     SW sent resume of care orders to [facility] via [facility].     SW will continue to follow for plan of care changes and remain available for any additional DC needs or concerns.     Pauline Maynard MSW, LSW  Discharge Planner   [id number]

## 2024-08-19 NOTE — CONSULTS
[facility]                       Gastroenterology Consultation-[facility] Gastroenterology    Andree Joshi Patient Status:  Inpatient    3/1/1935 MRN [id number]   Location [facility] 4NW-A Attending Joel Jaime MD   Hosp Day # 1 PCP Gena Rivas     Reason for consultation:   Nausea/vomiting  HPI:  89-year-old female with PMH metastatic breast cancer (Faslodex) was admitted with nausea and vomiting. She has a history of exploratory laparotomy with the repair of a perforated duodenal ulcer with a Jeff patch on 2024. She was discharged on  after negative UGI and return of bowel function and discharge with antibiotics for H pylori, and H pylori testing was negative. Since the surgery, she has had intermittent nausea and vomiting twice per week, and last night, she had dry heaves and vomited overnight. Denies dysphagia or odynophagia. She is tolerating a clear-liquid diet. Her last BM was two days ago. Denies hematochezia or melena. No PPI since completing antibiotics. She had a change in bowel habits to loose stools. BM x2 daily BSS6.Colonoscopy 24 to the cecum - normal except for internal hemorrhoids. Bx  Glucose 155, Na 133, K 3.3, AST 76, ALT 56, Alk phos 548, WBC 11.9, hgb 8.9, Mg 1.3. CT a/p  Numerous scattered hepatic masses were noted, which is concerning for metastatic disease.  The largest mass spans the left and right hepatic lobes, measuring up to 10.2 x 7.3 cm. Also, diffuse wall thickening of the large and small bowels with underlying nonspecific enterocolitis or other etiologies is not entirely excluded.  Mild-to-moderate diffuse abdominal/pelvic ascites.   PMHx:   Past Medical History:    Cancer (HCC)    History of stomach ulcers    Hyperlipidemia    Thyroid disease     PSHx:   Past Surgical History:   Procedure Laterality Date    Abdominal surgery      Mastectomy      Mastectomy right       Medications:    [COMPLETED] magnesium oxide (Mag-Ox) tab 800 mg  800 mg Oral  Once    [COMPLETED] potassium chloride (Klor-Con) 20 MEQ oral powder 40 mEq  40 mEq Oral Once    pantoprazole (Protonix) 40 mg in sodium chloride 0.9% PF 10 mL IV push  40 mg Intravenous Q12H    alum-mag hydroxide-simethicone (Maalox) 200-200-20 MG/5ML oral suspension 30 mL  30 mL Oral QID PRN    potassium chloride 10 mEq in dextrose 5%-sodium chloride 0.9% 1,000 mL infusion   Intravenous Continuous    azithromycin (Zithromax) tab 500 mg  500 mg Oral Daily    [COMPLETED] sodium chloride 0.9 % IV bolus 1,728 mL  30 mL/kg Intravenous Once    [COMPLETED] ondansetron (Zofran) 4 MG/2ML injection 4 mg  4 mg Intravenous Once    [COMPLETED] morphINE PF 4 MG/ML injection 4 mg  4 mg Intravenous Once    [COMPLETED] piperacillin-tazobactam (Zosyn) 4.5 g in dextrose 5% 100 mL IVPB-ADDV  4.5 g Intravenous Once    [COMPLETED] iopamidol 76% (ISOVUE-370) injection for power injector  80 mL Intravenous ONCE PRN    folic acid (Folvite) tab 1 mg  1 mg Oral Daily    levothyroxine (Synthroid) tab 100 mcg  100 mcg Oral Before breakfast    acetaminophen (Tylenol Extra Strength) tab 1,000 mg  1,000 mg Oral Q4H PRN    melatonin tab 3 mg  3 mg Oral Nightly PRN    glycerin-hypromellose- (Artificial Tears) 0.2-0.2-1 % ophthalmic solution 1 drop  1 drop Both Eyes QID PRN    sodium chloride (Saline Mist) 0.65 % nasal solution 1 spray  1 spray Each Nare Q3H PRN    ondansetron (Zofran) 4 MG/2ML injection 4 mg  4 mg Intravenous Q6H PRN    metoclopramide (Reglan) 5 mg/mL injection 10 mg  10 mg Intravenous Q8H PRN    polyethylene glycol (PEG 3350) (Miralax) 17 g oral packet 17 g  17 g Oral Daily PRN    sennosides (Senokot) tab 17.2 mg  17.2 mg Oral Nightly PRN    bisacodyl (Dulcolax) 10 MG rectal suppository 10 mg  10 mg Rectal Daily PRN    fleet enema (Fleet) 7-19 GM/118ML rectal enema 133 mL  1 enema Rectal Once PRN    [] dextrose in lactated ringers 5% infusion  100 mL/hr Intravenous Continuous    ampicillin-sulbactam (Unasyn) 3 g in  sodium chloride 0.9% 100mL IVPB-ADD  3 g Intravenous q6h     Allergies: No Known Allergies  SocHx:  No history of smoking;  The patient drinks alcohol on social occasions; The patient has no history of IV drug use or other illicit substances.  FamHx: The patient has no family history of colon cancer or other gastrointestinal malignancies;  No family history of ulcer disease, or inflammatory bowel disease  ROS:  In addition to the pertinent positives described above:            Infectious Disease:  No chronic infections or recent fevers prior to the acute illness            Cardiovascular: No history of CAD, prior MI, chest pain, or palpitations            Respiratory: No shortness of breath, asthma, copd, recurrent pneumonia            Hematologic: The patient reports no easy bruising, frequent gum bleeding or nose bleeding;  The patient has no history of known chronic anemia            Dermatologic: The patient reports no recent rashes or chronic skin disorders            Rheumatologic: The patient reports no history of chronic arthritis, myalgias, arthralgias            Genitourinary:  The patient reports no history of recurrent urinary tract infections, hematuria, dysuria, or nephrolithiasis           Psychiatric: The patient reports no history of depression, anxiety, suicidal ideation, or homicidal ideation           Oncologic: The patient reports no history of prior solid tumor or hematologic malignancy           ENT: The patient reports no hoarseness of voice, hearing loss, sinus congestion, tinnitus           Neurologic: The patient reports no history of seizure, stroke, or frequent headaches  PE: /56 (BP Location: Left arm)   Pulse 94   Temp 98.2 °F (36.8 °C) (Oral)   Resp 19   Ht 5' 5\" (1.651 m)   Wt 127 lb (57.6 kg)   SpO2 95%   BMI 21.13 kg/m²   Gen: AAO x 3, able to speak in complete sentences  HENT: EOMI, PERRL, oropharynx is clear with moist mucosal membranes  Eyes: Sclerae are  anicteric  Neck:  Supple without nuchal rigidity  CV: Regular rate and rhythm, with normal S1 and S2  Resp: Clear to auscultation bilaterally without wheezes; rubs, rhonchi, or rales  Abdomen: Soft, non-tender, non-distended with the presence of bowel sounds; No hepatosplenomegaly; no rebound or guarding; No ascites is clinically apparent; no tympany to percussion  Ext: No peripheral edema or cyanosis  Skin: Warm and dry  Psychiatric: Appropriate mood and congruent affect without obvious depression or anxiety  Labs:   Lab Results   Component Value Date    WBC 12.2 08/19/2024    HGB 8.3 08/19/2024    HCT 25.1 08/19/2024    .0 08/19/2024    CREATSERUM 0.56 08/19/2024    BUN 17 08/19/2024     08/19/2024    K 3.3 08/19/2024     08/19/2024    CO2 25.0 08/19/2024     08/19/2024    CA 7.8 08/19/2024    ALB 3.1 08/19/2024    ALKPHO 548 08/19/2024    BILT 0.3 08/19/2024    AST 76 08/19/2024    ALT 56 08/19/2024    MG 1.3 08/19/2024     Recent Labs   Lab 08/18/24  0631 08/19/24  0615   * 155*   BUN 22 17   CREATSERUM 0.77 0.56   CA 9.1 7.8*   * 133*   K 3.7 3.3*    105   CO2 25.0 25.0     Recent Labs   Lab 08/19/24  1255   RBC 2.50*   HGB 8.3*   HCT 25.1*   .4*   MCH 33.2   MCHC 33.1   RDW 15.3   NEPRELIM 11.10*   WBC 12.2*   .0       Recent Labs   Lab 08/18/24  0631 08/19/24  0615   ALT 81* 56*   * 76*       Imaging:   Narrative   PROCEDURE:  CT ABDOMEN+PELVIS (CONTRAST ONLY) (CPT=74177)     COMPARISON:  None.     INDICATIONS:  nausea vomiting, epigastric and back pain. recent perforated duodenal ulcer and known liver mass     TECHNIQUE:  CT scanning was performed from the dome of the diaphragm to the pubic symphysis with non-ionic intravenous contrast material. Post contrast coronal MPR imaging was performed.  Dose reduction techniques were used. Dose information is  transmitted to the ACR (American College of Radiology) NRDR (National Radiology Data  Registry) which includes the Dose Index Registry.     PATIENT STATED HISTORY:(As transcribed by Technologist)  Patient is here for epigastric and back pain with nausea and vomit. Recent hx of perforated duodenal ulcer.       CONTRAST USED:  80cc of Isovue 370     FINDINGS:  LUNG BASE:  Atelectasis/consolidation in the lower lungs with underlying pneumonia not excluded.  Clinical correlation recommended.  Partially imaged bilateral pleural effusions.  LIVER:  Numerous scattered hepatic masses noted which is concerning for metastatic disease.  The largest mass spans the left and right hepatic lobes measuring up to 10.2 x 7.3 cm.  BILIARY:  Cholelithiasis.  SPLEEN:  Unremarkable.  PANCREAS:  Unremarkable.  ADRENALS:  Nonspecific bilateral adrenal thickening.  KIDNEYS:  12 mm hypodensity in the right upper pole kidney with higher than fluid attenuation may represent a proteinaceous cyst, with a solid renal mass felt less likely but not entirely excluded.  Dedicated renal ultrasound may be helpful for further  evaluation as clinically directed.  There are some scattered subcentimeter hypodensities in the left kidney measuring up to 9 mm which are too small to further characterize and warrant no specific follow-up.  Simple appearing cyst in the upper pole left  kidney measuring up to 2.8 cm.  AORTA/VASCULAR:  Mild mixed plaque in the aorta and iliac arteries.  RETROPERITONEUM:  Unremarkable.  BOWEL/MESENTERY:  There is no large or small bowel dilatation.  There is diffuse wall thickening of the large and small bowel with underlying nonspecific enterocolitis or other etiologies not entirely excluded.  Mild-to-moderate diffuse abdominal/pelvic  ascites.  No free air.  ABDOMINAL WALL:  Anasarca  PELVIC ORGANS:  Unremarkable urinary bladder.  Status posthysterectomy.  LYMPH NODES:  No significant lymphadenopathy in the abdomen or pelvis.  BONES:  Degenerative changes in the spine and hips.  Orthopedic hardware in the proximal  left femur.  OTHER:  None.                   Impression   CONCLUSION:       1. Numerous scattered hepatic masses noted which is concerning for metastatic disease.  The largest mass spans the left and right hepatic lobes measuring up to 10.2 x 7.3 cm.     2. There is diffuse wall thickening of the large and small bowel with underlying nonspecific enterocolitis or other etiologies not entirely excluded.  Mild-to-moderate diffuse abdominal/pelvic ascites.  No free air.     3. Atelectasis/consolidation in the lower lungs with underlying pneumonia not excluded.  Clinical correlation recommended.  Partially imaged bilateral pleural effusions.     4. Cholelithiasis.     5. Anasarca.       Please see above for further details.        Impression:   89-year-old female with PMH metastatic breast cancer (Faslodex) was admitted with nausea and vomiting. On 7/20/2024, she was admitted for perforated duodenal ulcer s/p  exploratory laparotomy with the repair of a perforated duodenal ulcer with a Jeff patch biopsies negative for H pylori. Will proceed with EGD to r/o GOO vs carcinomatosis due to metastatic breast cancer vs infection.  Elevated liver enzymes-likely due to metastatic disease   Abdominal ascites       Recommendations:    Plan for EGD with MAC. The risks, benefits, alternatives of the procedure including the risks of anesthesia, bleeding, perforation, missed lesions, need for surgery were discussed with the patient. The patient expressed understanding of the plan.   Oncology Consult  Continue Protonix 40 mg IV BID.     Thank you for the consultation, we will follow the patient with you.  Attending addendum (Dr. Nelson Perez) to follow later today and provide formal, final recommendations at that time    Ernie Cooper, APRN  1:56 PM  8/19/2024  [facility]  [phone]    GI attending addendum:    I have personally seen and examined this patient and agree with above nurse practitioner's  assessment and recommendations. Briefly patient is an 89 y/o female with a h/o metastatic breast cancer, on palliative therapy through [facility], had recent perforated duodenal ulcer Status post Jeff patch 7/2024. Was home for nearly 4 weeks, slowly recovering when she came to stay with daughter in [facility], developed acute onset n/v/d without fever, chills. Received partial treatment for H.pylori with Metronidazole, Clarithromycin by surgeon, which was dced a few weeks ago, no antibiotics since. Since being here, still feels nauseated, no further vomiting or diarrhea. On exam, appears weak, abdomen soft, non-distended, non-tender. CT with diffuse bowel thickening. Differential includes viral process, malingnat involvement of GI tract, recurrent PUD, gastric outlet obstruction vs peritonitis.    Recommend EGD for further evaluation in am. R/B/A discussed in length with patient and patient's daughter Liana and son in law over the phone. They will discuss further with the rest of the family and give us decision in am. In the interim, may have clear liquids, NPO after midnight    Nelson Perez MD  3:44 PM  8/19/2024  [facility]astroenter[facility]  [phone]

## 2024-08-20 ENCOUNTER — APPOINTMENT (OUTPATIENT)
Dept: GENERAL RADIOLOGY | Facility: HOSPITAL | Age: 89
End: 2024-08-20
Attending: HOSPITALIST
Payer: MEDICARE

## 2024-08-20 ENCOUNTER — ANESTHESIA (OUTPATIENT)
Dept: ENDOSCOPY | Facility: HOSPITAL | Age: 89
End: 2024-08-20
Payer: MEDICARE

## 2024-08-20 LAB
ALBUMIN SERPL-MCNC: 2.9 G/DL (ref 3.2–4.8)
ALBUMIN/GLOB SERPL: 1.3 {RATIO} (ref 1–2)
ALP LIVER SERPL-CCNC: 457 U/L
ALT SERPL-CCNC: 48 U/L
ANION GAP SERPL CALC-SCNC: 4 MMOL/L (ref 0–18)
AST SERPL-CCNC: 65 U/L (ref ?–34)
BILIRUB SERPL-MCNC: 0.3 MG/DL (ref 0.2–1.1)
BUN BLD-MCNC: 14 MG/DL (ref 9–23)
CALCIUM BLD-MCNC: 7.8 MG/DL (ref 8.7–10.4)
CHLORIDE SERPL-SCNC: 106 MMOL/L (ref 98–112)
CO2 SERPL-SCNC: 26 MMOL/L (ref 21–32)
CREAT BLD-MCNC: 0.52 MG/DL
DEPRECATED HBV CORE AB SER IA-ACNC: 276.6 NG/ML
EGFRCR SERPLBLD CKD-EPI 2021: 89 ML/MIN/1.73M2 (ref 60–?)
ERYTHROCYTE [DISTWIDTH] IN BLOOD BY AUTOMATED COUNT: 14.7 %
GLOBULIN PLAS-MCNC: 2.3 G/DL (ref 2–3.5)
GLUCOSE BLD-MCNC: 103 MG/DL (ref 70–99)
HCT VFR BLD AUTO: 23 %
HGB BLD-MCNC: 8 G/DL
HGB RETIC QN AUTO: 35.8 PG (ref 28.2–36.6)
IMM RETICS NFR: 0.17 RATIO (ref 0.1–0.3)
IRON SATN MFR SERPL: 23 %
IRON SERPL-MCNC: 50 UG/DL
MAGNESIUM SERPL-MCNC: 1.4 MG/DL (ref 1.6–2.6)
MAGNESIUM SERPL-MCNC: 1.4 MG/DL (ref 1.6–2.6)
MCH RBC QN AUTO: 33.2 PG (ref 26–34)
MCHC RBC AUTO-ENTMCNC: 34.8 G/DL (ref 31–37)
MCV RBC AUTO: 95.4 FL
OSMOLALITY SERPL CALC.SUM OF ELEC: 283 MOSM/KG (ref 275–295)
PLATELET # BLD AUTO: 337 10(3)UL (ref 150–450)
POTASSIUM SERPL-SCNC: 3.6 MMOL/L (ref 3.5–5.1)
POTASSIUM SERPL-SCNC: 3.6 MMOL/L (ref 3.5–5.1)
PROT SERPL-MCNC: 5.2 G/DL (ref 5.7–8.2)
RBC # BLD AUTO: 2.41 X10(6)UL
RETICS # AUTO: 86.9 X10(3) UL (ref 22.5–147.5)
RETICS/RBC NFR AUTO: 3.6 %
SODIUM SERPL-SCNC: 136 MMOL/L (ref 136–145)
TOTAL IRON BINDING CAPACITY: 218 UG/DL (ref 250–425)
TRANSFERRIN SERPL-MCNC: 156 MG/DL (ref 250–380)
WBC # BLD AUTO: 9.1 X10(3) UL (ref 4–11)

## 2024-08-20 PROCEDURE — 99233 SBSQ HOSP IP/OBS HIGH 50: CPT | Performed by: INTERNAL MEDICINE

## 2024-08-20 PROCEDURE — 99232 SBSQ HOSP IP/OBS MODERATE 35: CPT | Performed by: HOSPITALIST

## 2024-08-20 PROCEDURE — 0DB68ZX EXCISION OF STOMACH, VIA NATURAL OR ARTIFICIAL OPENING ENDOSCOPIC, DIAGNOSTIC: ICD-10-PCS | Performed by: INTERNAL MEDICINE

## 2024-08-20 PROCEDURE — 71045 X-RAY EXAM CHEST 1 VIEW: CPT | Performed by: HOSPITALIST

## 2024-08-20 RX ORDER — SODIUM CHLORIDE, SODIUM LACTATE, POTASSIUM CHLORIDE, CALCIUM CHLORIDE 600; 310; 30; 20 MG/100ML; MG/100ML; MG/100ML; MG/100ML
INJECTION, SOLUTION INTRAVENOUS CONTINUOUS PRN
Status: DISCONTINUED | OUTPATIENT
Start: 2024-08-20 | End: 2024-08-20 | Stop reason: SURG

## 2024-08-20 RX ORDER — FUROSEMIDE 10 MG/ML
20 INJECTION INTRAMUSCULAR; INTRAVENOUS ONCE
Status: COMPLETED | OUTPATIENT
Start: 2024-08-20 | End: 2024-08-20

## 2024-08-20 RX ADMIN — SODIUM CHLORIDE, SODIUM LACTATE, POTASSIUM CHLORIDE, CALCIUM CHLORIDE: 600; 310; 30; 20 INJECTION, SOLUTION INTRAVENOUS at 12:23:00

## 2024-08-20 NOTE — PROGRESS NOTES
Hematology/Oncology Progress Note    Patient Name: Andree Joshi  Medical Record Number: [id number]    YOB: 1935     Reason for Consultation:  Andree Joshi was seen today for the diagnosis of metastatic breast cancer    Interval events:      - MRI brain negative for malignancy  - she has decided to do EGD  - reports nausea controlled    Inpatient Meds:   pantoprazole  40 mg Intravenous Q12H    folic acid  1 mg Oral Daily    levothyroxine  100 mcg Oral Before breakfast    ampicillin-sulbactam  3 g Intravenous q6h      potassium chloride 10 mEq in dextrose 5%-sodium chloride 0.9% 1,000 mL infusion 50 mL/hr at 08/19/24 1036       PRN Meds:    alum-mag hydroxide-simethicone    acetaminophen    melatonin    glycerin-hypromellose-    sodium chloride    ondansetron    metoclopramide    polyethylene glycol (PEG 3350)    sennosides    bisacodyl    fleet enema    Allergies:   No Known Allergies    Vital Signs:  Height: 165.1 cm (5' 5\") (08/19 2112)  Weight: 57.6 kg (127 lb) (08/19 2112)  BSA (Calculated - sq m): 1.63 sq meters (08/19 2112)  Pulse: 83 (08/20 0609)  BP: 126/68 (08/20 0609)  Temp: 98 °F (36.7 °C) (08/20 0700)  Do Not Use - Resp Rate: --  SpO2: 96 % (08/20 0700)    Wt Readings from Last 6 Encounters:   08/18/24 57.6 kg (127 lb)   08/19/24 57.6 kg (127 lb)       Physical Examination:  General: Patient is alert and oriented, not in acute distress  Psych: Mood and affect are appropriate  Eyes: EOMI, PERRL  ENT: Oropharynx is clear, no adenopathy  CV:no LE edema  Respiratory: Non-labored respirations  GI/Abd: Soft, non-tender, non-distended  Neurological: Grossly intact   Skin: no rashes or petechiae    Laboratory:  Recent Labs   Lab 08/18/24  0631 08/19/24  0615 08/19/24  1255   WBC 8.5 11.9* 12.2*   HGB 10.9* 8.9* 8.3*   HCT 30.2* 24.9* 25.1*   .0* 341.0 339.0   MCV 93.5 93.3 100.4*   RDW 14.7 14.6 15.3   NEPRELIM 7.92* 10.90* 11.10*       Recent Labs   Lab 08/18/24  0631  08/19/24  0615   * 133*   K 3.7 3.3*    105   CO2 25.0 25.0   BUN 22 17   CREATSERUM 0.77 0.56   * 155*   CA 9.1 7.8*   TP 7.1 5.5*   ALB 3.9 3.1*   ALKPHO 790* 548*   * 76*   ALT 81* 56*   BILT 0.5 0.3       Recent Labs   Lab 08/18/24  0631   INR 0.99   PTT 25.7       Impression & Plan:     Metastatic breast cancer to liver  - prior tx history reviewed; had progression on letrozole, now on fulvestrant with plan to add ribociclib later when performance status improves  - EGD to evaluate recent duodenal repair  - brain MRI negative for intracranial metastases  - pt still deciding about hospice vs continuing treatment; discussed that she still has time to think about this.    Nausea  - controlled. She should go out on zofran + compazine PRN prescriptions    Anemia  - iron studies consistent with anemia of chronic disease. Has folic acid deficiency; continue folic acid 1mg daily     Advance care planning  - we discussed code status. She endorsed DNAR/select.    Olayinka Dejesus MD  Hematology/Medical Oncology  [facility]

## 2024-08-20 NOTE — ANESTHESIA POSTPROCEDURE EVALUATION
[facility] Patient Status:  Inpatient   Age/Gender 89 year old female MRN [id number]   Location [facility] ENDOSCOPY [facility]IN [facility]ENTER Attending Joel Jaime MD   Hosp Day # 2 PCP Gena Rivas       Anesthesia Post-op Note    ESOPHAGOGASTRODUODENOSCOPY (EGD) with biopsies    Procedure Summary       Date: 08/20/24 Room / Location:  ENDOSCOPY 04 /  ENDOSCOPY    Anesthesia Start: 1223 Anesthesia Stop: 1247    Procedure: ESOPHAGOGASTRODUODENOSCOPY (EGD) with biopsies Diagnosis:       Nausea and vomiting, unspecified vomiting type      (Esophagitis, hiatal hernia, duodenal ulcers)    Surgeons: Nelson Perez MD Anesthesiologist: Andree Mejia MD    Anesthesia Type: MAC ASA Status: 3            Anesthesia Type: MAC    Vitals Value Taken Time   BP 81/50 08/20/24 1305   Temp  08/20/24 1310   Pulse 60 08/20/24 1309   Resp 15 08/20/24 1309   SpO2 94 % 08/20/24 1309   Vitals shown include unfiled device data.    Patient Location: Endoscopy    Anesthesia Type: MAC    Airway Patency: patent    Postop Pain Control: adequate    Mental Status: preanesthetic baseline    Nausea/Vomiting: none    Cardiopulmonary/Hydration status: stable euvolemic    Complications: no apparent anesthesia related complications    Postop vital signs: stable    Dental Exam: Unchanged from Preop    Patient to be discharged from PACU when criteria met.

## 2024-08-20 NOTE — PLAN OF CARE
Patient is alert and oriented x 4. Behavior is calm and pleasant. Compliant with medications and care. Had EGD done today that showed hiatal hernia, gastritis and duodenal ulcers. Patient tolerated procedure well. Per GI, patient to be started on protonix gtt at 8 mg/hr for the next 24 hours and on a full liquid diet. Patient agreeable to this plan. Patient returned from procedure and o2 saturation was in mid 80's, placed on 3 liters of oxygen. Patient still occasionally desaturating and having notable wheezing. Hospitalist notified and ordered STAT chest xray as well as discontinuation of IVF that were running on patient. Medications given per mar. Patient denies pain during comfort rounds. Assisted with adl's and toileting for patient. Will continue to monitor respiratory status. Call light within reach.    1905: Chest xray results reviewed:   Impression   CONCLUSION:    Evidence of mild pulmonary edema, pulmonary vascular congestion is with mild bibasilar effusions.   Hospitalist notified and ordered one time dose of lasix. Updated report endorsed to oncoming RN.    Problem: GASTROINTESTINAL - ADULT  Goal: Minimal or absence of nausea and vomiting  Description: INTERVENTIONS:  - Maintain adequate hydration with IV or PO as ordered and tolerated  - Nasogastric tube to low intermittent suction as ordered  - Evaluate effectiveness of ordered antiemetic medications  - Provide nonpharmacologic comfort measures as appropriate  - Advance diet as tolerated, if ordered  - Obtain nutritional consult as needed  - Evaluate fluid balance  Outcome: Progressing     Problem: GASTROINTESTINAL - ADULT  Goal: Maintains adequate nutritional intake (undernourished)  Description: INTERVENTIONS:  - Monitor percentage of each meal consumed  - Identify factors contributing to decreased intake, treat as appropriate  - Assist with meals as needed  - Monitor I&O, WT and lab values  - Obtain nutritional consult as needed  - Optimize oral  hygiene and moisture  - Encourage food from home; allow for food preferences  - Enhance eating environment  Outcome: Progressing

## 2024-08-20 NOTE — ANESTHESIA POSTPROCEDURE EVALUATION
[facility] Patient Status:  Inpatient   Age/Gender 89 year old female MRN [id number]   Location [facility] ENDOSCOPY PAIN CENTER Attending Joel Jaime MD   Hosp Day # 2 PCP Gena Rivas       Anesthesia Post-op Note    ESOPHAGOGASTRODUODENOSCOPY (EGD) with biopsies    Procedure Summary       Date: 08/20/24 Room / Location:  ENDOSCOPY 04 /  ENDOSCOPY    Anesthesia Start: 1223 Anesthesia Stop: 1247    Procedure: ESOPHAGOGASTRODUODENOSCOPY (EGD) with biopsies Diagnosis:       Nausea and vomiting, unspecified vomiting type      (Esophagitis, hiatal hernia, duodenal ulcers)    Surgeons: Nelson Perez MD Anesthesiologist: Andree Mejia MD    Anesthesia Type: MAC ASA Status: 3            Anesthesia Type: MAC    Vitals Value Taken Time   BP 91/46 08/20/24 1320   Temp  08/20/24 1329   Pulse 65 08/20/24 1320   Resp 15 08/20/24 1320   SpO2 95 % 08/20/24 1320       Patient Location: Endoscopy    Anesthesia Type: MAC    Airway Patency: patent    Postop Pain Control: adequate    Mental Status: preanesthetic baseline    Nausea/Vomiting: none    Cardiopulmonary/Hydration status: stable euvolemic    Complications: no apparent anesthesia related complications    Postop vital signs: stable    Dental Exam: Unchanged from Preop    Patient to be discharged from PACU when criteria met.

## 2024-08-20 NOTE — OPERATIVE REPORT
Operative Report-Esophagogastroduodenoscopy with biopsy      PREOPERATIVE DIAGNOSIS/INDICATION:  Nausea, vomiting  H/o duodenal ulcer with perforation  POSTOPERTATIVE DIAGNOSIS:  Multiple large active duodenal ulcers, one Status post Jeff patch  LA Class D esophagitis  Hiatal hernia - 5 cm - Hill Grade IV  PROCEDURE PERFORMED: EGD with biopsy  INFORMED CONSENT: Once a brief history and physical examination was performed, the risks, benefits and alternatives to the procedure were discussed with the patient and/or family and informed consent was obtained.  The risks of sedation, perforation, missed lesions and need for surgery were all discussed.  Patient expressed understanding of the risks and agreed to proceed.    PROCEDURE DESCRIPTION:  The patient was then brought to the endoscopy suite where his/her pulse, pulse oximetry and blood pressure were monitored. He/she was placed in the left lateral decubitus position and deep sedation was administered. Once adequate sedation was achieved, a bite block was placed and a lubricated tip of an Olympus video upper endoscope was inserted through the oropharynx and gently manipulated through the esophagus into the stomach and the distal duodenum. Upon withdrawal of the endoscope, careful visualization of the mucosa was performed.   FINDINGS:  ESOPHAGUS:Normal in course and caliber, Circumferential ulceration starting in the proximal esophagus extending all the way to the GE junction, no normal intervening mucosa seen  EGJ: Located at 35 cm otherwise, diaphragmatic impression at 40 cm, 5 cm hiatal hernia  STOMACH: Mild erythema of the antrum, otherwise normal without ulcers, masses, polyps. Gastric insufflation normal with normal rugal pattern.   DUODENUM: Five shallow and deep duodenal  ulcers ranging from 7 mm to 20 mm scattered in the bulb and sweep. The largest ulcer along the proximal wall of the bulb had suture material emanating from the base  consistent with recent Jeff patch repair. All of the ulcers had a clean base, one with mild peripheral oozing, no visible vessel or stigmata of recent hemorrhage. The descending duodenum was normal.   THERAPEUTICS: Biopsies were performed of the antrum, body  RECOMMENDATIONS:   Post EGD precautions, watch for bleeding, infection, perforation, adverse drug reactions   Follow biopsies  IV PPI infusion for 24 hours  Full liquid diet   .  .  CC Report:     Nelson Perez MD  8/20/2024  12:39 PM  Gena Rivas

## 2024-08-20 NOTE — PROGRESS NOTES
[facility]   part of [facility]     Hospitalist Progress Note     Andree Joshi Patient Status:  Inpatient    3/1/1935 MRN [id number]   Location [facility] 4NW-A Attending Joel Jaime MD   Hosp Day # 2 PCP Gena Rivas     Chief Complaint: vomiting    Subjective:     Patient feels ok.  No complaints.     Objective:    Review of Systems:   ROS completed; pertinent positive and negatives stated in subjective.    Vital signs:  Temp:  [97.9 °F (36.6 °C)-98.4 °F (36.9 °C)] 97.9 °F (36.6 °C)  Pulse:  [57-83] 65  Resp:  [15-20] 15  BP: ()/(34-68) 91/46  SpO2:  [91 %-96 %] 95 %    Physical Exam:    General: No acute distress  Respiratory: Clear to auscultation bilaterally  Cardiovascular: S1, S2.  Abdomen: Soft, TTP epigastrium  Neuro: No new focal deficits      Diagnostic Data:    Labs:  Recent Labs   Lab 24  0631 24  0615 24  1255 24  0805   WBC 8.5 11.9* 12.2* 9.1   HGB 10.9* 8.9* 8.3* 8.0*   MCV 93.5 93.3 100.4* 95.4   .0* 341.0 339.0 337.0   INR 0.99  --   --   --        Recent Labs   Lab 24  0631 24  0615 24  0805   * 155* 103*   BUN 22 17 14   CREATSERUM 0.77 0.56 0.52*   CA 9.1 7.8* 7.8*   ALB 3.9 3.1* 2.9*   * 133* 136   K 3.7 3.3* 3.6  3.6    105 106   CO2 25.0 25.0 26.0   ALKPHO 790* 548* 457*   * 76* 65*   ALT 81* 56* 48   BILT 0.5 0.3 0.3   TP 7.1 5.5* 5.2*       Estimated Creatinine Clearance: 67.3 mL/min (A) (based on SCr of 0.52 mg/dL (L)).     No results for input(s): \"TROP\", \"TROPHS\", \"CK\" in the last 168 hours.    Recent Labs   Lab 24  0631   PTP 13.1   INR 0.99              Imaging: Imaging data reviewed in Epic.    Medications:    pantoprazole  40 mg Intravenous Q12H    folic acid  1 mg Oral Daily    levothyroxine  100 mcg Oral Before breakfast    ampicillin-sulbactam  3 g Intravenous q6h       Assessment & Plan:     #Sepsis  BCX NTD  Minimize IVF given risk of fluid OL     #PNA  IV  ABX  Plan on 5 days of therapy     #Recent perforated duodenal ulcer/PUD  Now with inability to tolerate PO intake with vomiting  Cont. IV PPI BID  EGD today    #Diarrhea  Resolved  Stool studies it re-occurs    #Anemia  Hgb downtrending  Fe studies  IV iron    #Metastatic Stage IV BRCA  Follows with onc as OP  On palliative therapy  MRI brain neg for METS     #Pre DM with stress hyperglycemia  Likely stress/infection related  A1c 5.7     #Transaminase elevation  Due to liver mets  monitor     #Hypothyroid     #AOCD        Dispo:  EGD today    Joel Jaime MD    Addendum:    Discussed with GI.  EGD with several deep ulcers. Cont. IV PPI BID.  Monitor hgb.      Joel Jaime MD      Supplementary Documentation:     Quality:    DVT Mechanical Prophylaxis:     Early ambuation  DVT Pharmacologic Prophylaxis   Medication   None                Code Status: DNAR/Selective Treatment  Mandujano: No urinary catheter in place  Mandujano Duration (in days):   Central line:    NURY:       Discharge is dependent on: clinical stability  At this point Ms. Joshi is expected to be discharge to: home    The 21st Century Cures Act makes medical notes like these available to patients in the interest of transparency. Please be advised this is a medical document. Medical documents are intended to carry relevant information, facts as evident, and the clinical opinion of the practitioner. The medical note is intended as peer to peer communication and may appear blunt or direct. It is written in medical language and may contain abbreviations or verbiage that are unfamiliar.

## 2024-08-20 NOTE — PLAN OF CARE
Problem: Patient/Family Goals  Goal: Patient/Family Long Term Goal  Description: Patient's Long Term Goal:     Interventions:  -   - See additional Care Plan goals for specific interventions  Outcome: Progressing  Goal: Patient/Family Short Term Goal  Description: Patient's Short Term Goal:     Interventions:   -   - See additional Care Plan goals for specific interventions  Outcome: Progressing     Problem: GASTROINTESTINAL - ADULT  Goal: Minimal or absence of nausea and vomiting  Description: INTERVENTIONS:  - Maintain adequate hydration with IV or PO as ordered and tolerated  - Nasogastric tube to low intermittent suction as ordered  - Evaluate effectiveness of ordered antiemetic medications  - Provide nonpharmacologic comfort measures as appropriate  - Advance diet as tolerated, if ordered  - Obtain nutritional consult as needed  - Evaluate fluid balance  Outcome: Progressing  Goal: Maintains or returns to baseline bowel function  Description: INTERVENTIONS:  - Assess bowel function  - Maintain adequate hydration with IV or PO as ordered and tolerated  - Evaluate effectiveness of GI medications  - Encourage mobilization and activity  - Obtain nutritional consult as needed  - Establish a toileting routine/schedule  - Consider collaborating with pharmacy to review patient's medication profile  Outcome: Progressing  Goal: Maintains adequate nutritional intake (undernourished)  Description: INTERVENTIONS:  - Monitor percentage of each meal consumed  - Identify factors contributing to decreased intake, treat as appropriate  - Assist with meals as needed  - Monitor I&O, WT and lab values  - Obtain nutritional consult as needed  - Optimize oral hygiene and moisture  - Encourage food from home; allow for food preferences  - Enhance eating environment  Outcome: Progressing  Goal: Achieves appropriate nutritional intake (bariatric)  Description: INTERVENTIONS:  - Monitor for over-consumption  - Identify factors  contributing to increased intake, treat as appropriate  - Monitor I&O, WT and lab values  - Obtain nutritional consult as needed  - Evaluate psychosocial factors contributing to over-consumption  Outcome: Progressing    Patient alert x3, VSS. No complaint of pain. MRI of brain completed overnight. IV fuids. NPO for EGD.

## 2024-08-21 LAB
ANION GAP SERPL CALC-SCNC: 7 MMOL/L (ref 0–18)
BASOPHILS # BLD AUTO: 0.01 X10(3) UL (ref 0–0.2)
BASOPHILS NFR BLD AUTO: 0.1 %
BUN BLD-MCNC: 15 MG/DL (ref 9–23)
CALCIUM BLD-MCNC: 8 MG/DL (ref 8.7–10.4)
CHLORIDE SERPL-SCNC: 103 MMOL/L (ref 98–112)
CO2 SERPL-SCNC: 24 MMOL/L (ref 21–32)
CREAT BLD-MCNC: 0.58 MG/DL
EGFRCR SERPLBLD CKD-EPI 2021: 87 ML/MIN/1.73M2 (ref 60–?)
EOSINOPHIL # BLD AUTO: 0.03 X10(3) UL (ref 0–0.7)
EOSINOPHIL NFR BLD AUTO: 0.3 %
ERYTHROCYTE [DISTWIDTH] IN BLOOD BY AUTOMATED COUNT: 14.9 %
FOLATE SERPL-MCNC: 10.6 NG/ML (ref 5.4–?)
GLUCOSE BLD-MCNC: 136 MG/DL (ref 70–99)
HCT VFR BLD AUTO: 29.1 %
HGB BLD-MCNC: 10.1 G/DL
IMM GRANULOCYTES # BLD AUTO: 0.07 X10(3) UL (ref 0–1)
IMM GRANULOCYTES NFR BLD: 0.7 %
LYMPHOCYTES # BLD AUTO: 0.77 X10(3) UL (ref 1–4)
LYMPHOCYTES NFR BLD AUTO: 7.4 %
MAGNESIUM SERPL-MCNC: 1.9 MG/DL (ref 1.6–2.6)
MCH RBC QN AUTO: 33.2 PG (ref 26–34)
MCHC RBC AUTO-ENTMCNC: 34.7 G/DL (ref 31–37)
MCV RBC AUTO: 95.7 FL
MONOCYTES # BLD AUTO: 0.43 X10(3) UL (ref 0.1–1)
MONOCYTES NFR BLD AUTO: 4.1 %
NEUTROPHILS # BLD AUTO: 9.13 X10 (3) UL (ref 1.5–7.7)
NEUTROPHILS # BLD AUTO: 9.13 X10(3) UL (ref 1.5–7.7)
NEUTROPHILS NFR BLD AUTO: 87.4 %
OSMOLALITY SERPL CALC.SUM OF ELEC: 281 MOSM/KG (ref 275–295)
PLATELET # BLD AUTO: 452 10(3)UL (ref 150–450)
POTASSIUM SERPL-SCNC: 3.1 MMOL/L (ref 3.5–5.1)
RBC # BLD AUTO: 3.04 X10(6)UL
SODIUM SERPL-SCNC: 134 MMOL/L (ref 136–145)
VIT B12 SERPL-MCNC: 614 PG/ML (ref 211–911)
WBC # BLD AUTO: 10.4 X10(3) UL (ref 4–11)

## 2024-08-21 PROCEDURE — 99233 SBSQ HOSP IP/OBS HIGH 50: CPT | Performed by: INTERNAL MEDICINE

## 2024-08-21 PROCEDURE — 99232 SBSQ HOSP IP/OBS MODERATE 35: CPT | Performed by: INTERNAL MEDICINE

## 2024-08-21 RX ORDER — POTASSIUM CHLORIDE 1500 MG/1
40 TABLET, EXTENDED RELEASE ORAL ONCE
Status: COMPLETED | OUTPATIENT
Start: 2024-08-21 | End: 2024-08-21

## 2024-08-21 RX ORDER — PANTOPRAZOLE SODIUM 40 MG/1
40 TABLET, DELAYED RELEASE ORAL
Status: DISCONTINUED | OUTPATIENT
Start: 2024-08-21 | End: 2024-08-25

## 2024-08-21 RX ORDER — FUROSEMIDE 10 MG/ML
20 INJECTION INTRAMUSCULAR; INTRAVENOUS DAILY
Status: DISCONTINUED | OUTPATIENT
Start: 2024-08-21 | End: 2024-08-25

## 2024-08-21 NOTE — PROGRESS NOTES
Hematology/Oncology Progress Note    Patient Name: Andree Joshi  Medical Record Number: [id number]    YOB: 1935     Reason for Consultation:  Andree Joshi was seen today for the diagnosis of metastatic breast cancer    Interval events:      - EGD with multiple deep duodenal ulcers  - now on 4L O2, CXR with mild pulmonary edema. Was given lasix   - she is trying to figure out if she will continue with Dr Desir for care as she now lives with her daughter in [facility] going back to her home in [place]    Inpatient Meds:   pantoprazole  40 mg Oral BID AC    furosemide  20 mg Intravenous Daily    sodium ferric gluconate  125 mg Intravenous Daily    folic acid  1 mg Oral Daily    levothyroxine  100 mcg Oral Before breakfast    ampicillin-sulbactam  3 g Intravenous q6h           PRN Meds:    alum-mag hydroxide-simethicone    acetaminophen    melatonin    glycerin-hypromellose-    sodium chloride    ondansetron    metoclopramide    polyethylene glycol (PEG 3350)    sennosides    bisacodyl    fleet enema    Allergies:   No Known Allergies    Vital Signs:  Height: --  Weight: --  BSA (Calculated - sq m): --  Pulse: 77 (08/21 0423)  BP: 102/64 (08/21 0423)  Temp: 98.1 °F (36.7 °C) (08/21 0423)  Do Not Use - Resp Rate: --  SpO2: 86 % (08/21 0423)    Wt Readings from Last 6 Encounters:   08/18/24 57.6 kg (127 lb)   08/19/24 57.6 kg (127 lb)       Physical Examination:  General: Patient is alert and oriented, not in acute distress  Psych: Mood and affect are appropriate  Eyes: EOMI, PERRL  ENT: Oropharynx is clear, no adenopathy  CV:no LE edema  Respiratory: Non-labored respirations  GI/Abd: Soft, non-tender, non-distended  Neurological: Grossly intact   Skin: no rashes or petechiae    Laboratory:  Recent Labs   Lab 08/18/24  0631 08/19/24  0615 08/19/24  1255 08/20/24  0805   WBC 8.5 11.9* 12.2* 9.1   HGB 10.9* 8.9* 8.3* 8.0*   HCT 30.2* 24.9* 25.1* 23.0*   .0* 341.0 339.0 337.0   MCV 93.5  93.3 100.4* 95.4   RDW 14.7 14.6 15.3 14.7   NEPRELIM 7.92* 10.90* 11.10*  --        Recent Labs   Lab 08/18/24  0631 08/19/24  0615 08/20/24  0805   * 133* 136   K 3.7 3.3* 3.6  3.6    105 106   CO2 25.0 25.0 26.0   BUN 22 17 14   CREATSERUM 0.77 0.56 0.52*   * 155* 103*   CA 9.1 7.8* 7.8*   TP 7.1 5.5* 5.2*   ALB 3.9 3.1* 2.9*   ALKPHO 790* 548* 457*   * 76* 65*   ALT 81* 56* 48   BILT 0.5 0.3 0.3       Recent Labs   Lab 08/18/24  0631   INR 0.99   PTT 25.7       Impression & Plan:     Metastatic breast cancer to liver  - prior tx history reviewed; had progression on letrozole, now on fulvestrant with plan to add ribociclib later when performance status improves  - brain MRI negative for intracranial metastases  - pt to likely continue treatment. Discussed we would be happy to assume her care here at [place] with one of my partners who specializes in breast cancer vs continuing with Dr Desir for her cancer care    Duodenal ulcers  - EGD with persistent duodenal ulcers, now on high dose PPI    Nausea  - controlled. She should go out on zofran + compazine PRN prescriptions    Anemia  - iron studies consistent with anemia of chronic disease. Has folic acid deficiency; continue folic acid 1mg daily     Advance care planning  - we discussed code status. She endorsed DNAR/select.    Olayinka Dejesus MD  Hematology/Medical Oncology  [facility]

## 2024-08-21 NOTE — PLAN OF CARE
Pt Aox4. VSS. O2 2LNC. Able to wean of to room air for most of the day. Saturating 90-92%. Requires O2 when eating or walking,  No complain of pain N/V/D.  Poor appetite.  Up with supervision.  Will continue plan of care.     Problem: GASTROINTESTINAL - ADULT  Goal: Minimal or absence of nausea and vomiting  Description: INTERVENTIONS:  - Maintain adequate hydration with IV or PO as ordered and tolerated  - Nasogastric tube to low intermittent suction as ordered  - Evaluate effectiveness of ordered antiemetic medications  - Provide nonpharmacologic comfort measures as appropriate  - Advance diet as tolerated, if ordered  - Obtain nutritional consult as needed  - Evaluate fluid balance  Outcome: Progressing  Goal: Maintains or returns to baseline bowel function  Description: INTERVENTIONS:  - Assess bowel function  - Maintain adequate hydration with IV or PO as ordered and tolerated  - Evaluate effectiveness of GI medications  - Encourage mobilization and activity  - Obtain nutritional consult as needed  - Establish a toileting routine/schedule  - Consider collaborating with pharmacy to review patient's medication profile  Outcome: Progressing  Goal: Maintains adequate nutritional intake (undernourished)  Description: INTERVENTIONS:  - Monitor percentage of each meal consumed  - Identify factors contributing to decreased intake, treat as appropriate  - Assist with meals as needed  - Monitor I&O, WT and lab values  - Obtain nutritional consult as needed  - Optimize oral hygiene and moisture  - Encourage food from home; allow for food preferences  - Enhance eating environment  Outcome: Progressing  Goal: Achieves appropriate nutritional intake (bariatric)  Description: INTERVENTIONS:  - Monitor for over-consumption  - Identify factors contributing to increased intake, treat as appropriate  - Monitor I&O, WT and lab values  - Obtain nutritional consult as needed  - Evaluate psychosocial factors contributing to  over-consumption  Outcome: Progressing

## 2024-08-21 NOTE — PROGRESS NOTES
[facility]   part of [facility]     Hospitalist Progress Note     Andree Joshi Patient Status:  Inpatient    3/1/1935 MRN [id number]   Location [facility] 4NW-A Attending Joel Jaime MD   Hosp Day # 3 PCP Gena Rivas     Chief Complaint: vomiting    Subjective:     Patient with continued O2 needs. Breathing stable. No CP. No abd pain. Had EGD yesterday.     Objective:    Review of Systems:   ROS completed; pertinent positive and negatives stated in subjective.    Vital signs:  Temp:  [97.9 °F (36.6 °C)-98.3 °F (36.8 °C)] 98.1 °F (36.7 °C)  Pulse:  [54-77] 77  Resp:  [15-20] 20  BP: ()/(34-64) 102/64  SpO2:  [86 %-96 %] 86 %    Physical Exam:    General: No acute distress  Respiratory: Clear to auscultation bilaterally  Cardiovascular: S1, S2.  Abdomen: Soft, TTP epigastrium  Neuro: No new focal deficits  Ext: trace edema      Diagnostic Data:    Labs:  Recent Labs   Lab 24  0631 24  0615 24  1255 24  0805   WBC 8.5 11.9* 12.2* 9.1   HGB 10.9* 8.9* 8.3* 8.0*   MCV 93.5 93.3 100.4* 95.4   .0* 341.0 339.0 337.0   INR 0.99  --   --   --        Recent Labs   Lab 24  0631 24  0615 24  0805   * 155* 103*   BUN 22 17 14   CREATSERUM 0.77 0.56 0.52*   CA 9.1 7.8* 7.8*   ALB 3.9 3.1* 2.9*   * 133* 136   K 3.7 3.3* 3.6  3.6    105 106   CO2 25.0 25.0 26.0   ALKPHO 790* 548* 457*   * 76* 65*   ALT 81* 56* 48   BILT 0.5 0.3 0.3   TP 7.1 5.5* 5.2*       Estimated Creatinine Clearance: 67.3 mL/min (A) (based on SCr of 0.52 mg/dL (L)).     No results for input(s): \"TROP\", \"TROPHS\", \"CK\" in the last 168 hours.    Recent Labs   Lab 24  0631   PTP 13.1   INR 0.99              Imaging: Imaging data reviewed in Epic.    Medications:    pantoprazole  40 mg Oral BID AC    sodium ferric gluconate  125 mg Intravenous Daily    folic acid  1 mg Oral Daily    levothyroxine  100 mcg Oral Before breakfast    ampicillin-sulbactam   3 g Intravenous q6h       Assessment & Plan:     #Sepsis  BCX NTD  Minimize IVF given risk of fluid OL     #PNA  IV ABX  Plan on 5 days of therapy     #Recent perforated duodenal ulcer/PUD  Now with inability to tolerate PO intake with vomiting  Cont. IV PPI BID  EGD with multiple large ulcers    #Diarrhea  Resolved  Stool studies it re-occurs    #Anemia  IV iron    #Metastatic Stage IV BRCA  Follows with onc as OP  On palliative therapy  MRI brain neg for METS     #Pre DM with stress hyperglycemia  Likely stress/infection related  A1c 5.7     #Transaminase elevation  Due to liver mets  Monitor  Lasix for ascities      #Hypothyroid     #AOCD        Yo Doherty MD'      Supplementary Documentation:     Quality:    DVT Mechanical Prophylaxis:     Early ambuation  DVT Pharmacologic Prophylaxis   Medication   None                Code Status: DNAR/Selective Treatment  Mandujano: No urinary catheter in place  Mandujano Duration (in days):   Central line:    NURY:       Discharge is dependent on: clinical stability  At this point Ms. Joshi is expected to be discharge to: home    The 21st Century Cures Act makes medical notes like these available to patients in the interest of transparency. Please be advised this is a medical document. Medical documents are intended to carry relevant information, facts as evident, and the clinical opinion of the practitioner. The medical note is intended as peer to peer communication and may appear blunt or direct. It is written in medical language and may contain abbreviations or verbiage that are unfamiliar.

## 2024-08-21 NOTE — PROGRESS NOTES
Inpatient Follow up Note    Andreeaugust Joshi Patient Status:  Inpatient    3/1/1935 MRN [id number]   Location [facility] 4NW-A Attending Yo Doherty MD   Hosp Day # 3 PCP Gena Rivas     Reason for Consultation   Nausea vomiting  Duodenal ulcers     Subjective     -Feels better today, able to tolerate some full lqiuids  -Denies dysphagia  -Was more short of breath yesterday, received on dose of Lasix  -X-ray with pulm edema  -Also feels abdomen more distended           Objective:     /59 (BP Location: Left arm)   Pulse 83   Temp 97.4 °F (36.3 °C) (Oral)   Resp 22   Ht 5' 5\" (1.651 m)   Wt 127 lb (57.6 kg)   SpO2 91%   BMI 21.13 kg/m²   Gen: AAO x2. Labored breathing  CV: RRR with normal S1 / S2  Resp: Decreased breath sounds at bases  Abd: (+)BS, distended, suture material at site of abdominal scar-midline leesa-umbilical.  no rebound or guarding  Ext: No edema or cyanosis  Skin: Warm and dry     Labs/Imaging     LABRCNTIP[PGLU:5@  Recent Labs   Lab 24  0631   INR 0.99         Recent Labs   Lab 24  0631 24  0615 24  1255 24  0805 24  1304   WBC 8.5 11.9* 12.2* 9.1 10.4   HGB 10.9* 8.9* 8.3* 8.0* 10.1*   .0* 341.0 339.0 337.0 452.0*       Recent Labs   Lab 24  0631 24  0615 24  0805 24  0959   * 133* 136 134*   K 3.7 3.3* 3.6  3.6 3.1*    105 106 103   CO2 25.0 25.0 26.0 24.0   BUN 22 17 14 15   CREATSERUM 0.77 0.56 0.52* 0.58       Recent Labs   Lab 24  0631 24  0615 24  0805   ALT 81* 56* 48   * 76* 65*     XR CHEST AP PORTABLE  (CPT=71045)    Result Date: 2024  CONCLUSION:  Evidence of mild pulmonary edema, pulmonary vascular congestion is with mild bibasilar effusions.   LOCATION:  [facility]      Dictated by (CST): Susannah Gutierrez DO on 2024 at 6:21 PM     Finalized by (CST): Susannah Gutierrez DO on 2024 at 6:21 PM       MRI BRAIN (W+WO) (CPT=70553)    Result Date:  8/19/2024  CONCLUSION:   1. No acute intracranial abnormality identified.  No specific findings to suggest intracranial metastatic disease.  2. Moderate chronic microvascular ischemic changes in the cerebral white matter and lucila.   LOCATION:  [facility]   Dictated by (CST): Papito Oneal MD on 8/19/2024 at 11:23 PM     Finalized by (CST): Papito Oneal MD on 8/19/2024 at 11:25 PM      AST   Date/Time Value Ref Range Status   08/20/2024 08:05 AM 65 (H) <34 U/L Final     ALT   Date/Time Value Ref Range Status   08/20/2024 08:05 AM 48 10 - 49 U/L Final     BUN   Date/Time Value Ref Range Status   08/21/2024 09:59 AM 15 9 - 23 mg/dL Final              Assessment     88 y/o female with a h/o metastatic breast CA admitted with nausea, vomiting related to severe esophagitis, multiple duodenal ulcers  H/o recent duodenal ulcer perforation Status post Jeff patch repair  Pulmonary edema  Symptomatic malignant ascites         Plan     Change PPI to po bid  Soft diet  Continue diuresis-discussed with Dr. Doherty  If still symptomatic in am, will recommend therapeutic paracentesis prior to discharge           Nelson Perez MD  2:29 PM  8/21/2024  [facility]  [phone]

## 2024-08-21 NOTE — PLAN OF CARE
Problem: Patient/Family Goals  Goal: Patient/Family Long Term Goal  Description: Patient's Long Term Goal:     Interventions:  -   - See additional Care Plan goals for specific interventions  Outcome: Progressing  Goal: Patient/Family Short Term Goal  Description: Patient's Short Term Goal:     Interventions:   -   - See additional Care Plan goals for specific interventions  Outcome: Progressing     Problem: GASTROINTESTINAL - ADULT  Goal: Minimal or absence of nausea and vomiting  Description: INTERVENTIONS:  - Maintain adequate hydration with IV or PO as ordered and tolerated  - Nasogastric tube to low intermittent suction as ordered  - Evaluate effectiveness of ordered antiemetic medications  - Provide nonpharmacologic comfort measures as appropriate  - Advance diet as tolerated, if ordered  - Obtain nutritional consult as needed  - Evaluate fluid balance  Outcome: Progressing  Goal: Maintains or returns to baseline bowel function  Description: INTERVENTIONS:  - Assess bowel function  - Maintain adequate hydration with IV or PO as ordered and tolerated  - Evaluate effectiveness of GI medications  - Encourage mobilization and activity  - Obtain nutritional consult as needed  - Establish a toileting routine/schedule  - Consider collaborating with pharmacy to review patient's medication profile  Outcome: Progressing  Goal: Maintains adequate nutritional intake (undernourished)  Description: INTERVENTIONS:  - Monitor percentage of each meal consumed  - Identify factors contributing to decreased intake, treat as appropriate  - Assist with meals as needed  - Monitor I&O, WT and lab values  - Obtain nutritional consult as needed  - Optimize oral hygiene and moisture  - Encourage food from home; allow for food preferences  - Enhance eating environment  Outcome: Progressing  Goal: Achieves appropriate nutritional intake (bariatric)  Description: INTERVENTIONS:  - Monitor for over-consumption  - Identify factors  contributing to increased intake, treat as appropriate  - Monitor I&O, WT and lab values  - Obtain nutritional consult as needed  - Evaluate psychosocial factors contributing to over-consumption  Outcome: Progressing    Pt alert x4, VSS. No complaint of pain. IV abx. Continuous protonix gtt. All meds given per MAR. Ambulates with assistance to bathroom. Slept well over night.

## 2024-08-21 NOTE — DIETARY NOTE
[facility]   part of [facility]    NUTRITION ASSESSMENT    Pt meets severe malnutrition criteria at this time.      CRITERIA FOR MALNUTRITION DIAGNOSIS:  Criteria for severe malnutrition diagnosis: chronic illness related to energy intake less than 75% for greater than 1 month, body fat severe depletion, and muscle mass severe depletion      NUTRITION INTERVENTION:    RD nutrition Care Plan- See RD nutrition assessment for additional recommendations  Meal and Snacks - Monitor and encourage adequate PO intake.   Medical Food Supplements - Ensure Plus High Protein TID Chocolate. Rationale/use for oral supplements discussed.  Nutrition Education - How to increase caloric intake NCM handout: suggested making ensure shake with ice cream and peanut butter increase fat and protein. Pt/family receptive to education, no barriers noted. Handouts provided.   Vitamin and Mineral Supplements - adding Multivitamin with minerals      PATIENT STATUS: 08/21/24 89/F admitted d/t N/V/D. Symptoms found to be related to duodenal ulcers. Symptoms are currently resolved. She was admitted 5 weeks ago for burst ulcer and received surgery. Daughter at bedside claimed she has not eaten much for 6 wks and lost her taste 7-8 wks ago. Pt drinks Ensure max 3x/day at home and reports trying to eat whatever sounds good. Pt was given educational handout on how to increase calories. Pt offered EPHP TID and accepted. Pt claims her wt being stable at 113 lbs with no recent s    ANTHROPOMETRICS:  Ht: 165.1 cm (5' 5\")  Wt: 57.6 kg (127 lb).   BMI: Body mass index is 21.13 kg/m².  IBW: 56.8 kg      WEIGHT HISTORY:   Weight loss: No pt normal wt is 113 lbs    Wt Readings from Last 10 Encounters:   08/18/24 57.6 kg (127 lb)   08/19/24 57.6 kg (127 lb)        NUTRITION:  Diet:       Procedures    Low Fiber/Soft diet Low Fiber/Soft; Is Patient on Accuchecks? No      Food Allergies: No  Cultural/Ethnic/Confucianist Preferences Addressed: Yes    Percent  Meals Eaten (last 3 days)       None            GI system review: constipation Last BM: 8/18  Skin and wounds: WNL    NUTRITION RELATED PHYSICAL FINDINGS:     1. Body Fat/Muscle Mass: severe depletion body fat Orbital fat pad and Triceps and severe muscle depletion Temple region, Clavicle region, and Shoulder/Acromion process     2. Fluid Accumulation: lower extremity edema     NUTRITION PRESCRIPTION: 56.8 kg (IBW)  Calories: 1428-7395 calories/day (30-35 kcal/kg)  Protein:  grams protein/day (1.3-1.8 grams protein per kg)  Fluid: ~1 ml/kcal or per MD discretion    NUTRITION DIAGNOSIS/PROBLEM:  Malnutrition related to insufficient appetite resulting in inadequate nutrition intake as evidenced by documented/reported insufficient oral intake, documented/reported unintentional weight loss, loss of fat mass, and loss of muscle mass      MONITOR AND EVALUATE/NUTRITION GOALS:  PO intake of 75% of meals TID - New  PO intake of 75% of oral nutrition supplement/s - New  Achieve and maintain dry wt +/- 1 to 2 lbs - New      MEDICATIONS:  Abx, lasix, Synthroid, Protonix, Kcl, Ferrlecit    LABS:  Glu 136, Na 134, K 3.2    Pt is at High nutrition risk    Celestino Baxter-[place]  Dietetic Intern  [id number]

## 2024-08-22 LAB
ANION GAP SERPL CALC-SCNC: 2 MMOL/L (ref 0–18)
BASOPHILS # BLD AUTO: 0.01 X10(3) UL (ref 0–0.2)
BASOPHILS NFR BLD AUTO: 0.1 %
BUN BLD-MCNC: 17 MG/DL (ref 9–23)
CALCIUM BLD-MCNC: 8.1 MG/DL (ref 8.7–10.4)
CHLORIDE SERPL-SCNC: 104 MMOL/L (ref 98–112)
CO2 SERPL-SCNC: 28 MMOL/L (ref 21–32)
CREAT BLD-MCNC: 0.57 MG/DL
EGFRCR SERPLBLD CKD-EPI 2021: 87 ML/MIN/1.73M2 (ref 60–?)
EOSINOPHIL # BLD AUTO: 0.03 X10(3) UL (ref 0–0.7)
EOSINOPHIL NFR BLD AUTO: 0.3 %
ERYTHROCYTE [DISTWIDTH] IN BLOOD BY AUTOMATED COUNT: 14.4 %
GLUCOSE BLD-MCNC: 106 MG/DL (ref 70–99)
HCT VFR BLD AUTO: 25.3 %
HGB BLD-MCNC: 8.9 G/DL
IMM GRANULOCYTES # BLD AUTO: 0.09 X10(3) UL (ref 0–1)
IMM GRANULOCYTES NFR BLD: 0.9 %
LYMPHOCYTES # BLD AUTO: 0.95 X10(3) UL (ref 1–4)
LYMPHOCYTES NFR BLD AUTO: 9.7 %
MCH RBC QN AUTO: 33.1 PG (ref 26–34)
MCHC RBC AUTO-ENTMCNC: 35.2 G/DL (ref 31–37)
MCV RBC AUTO: 94.1 FL
MONOCYTES # BLD AUTO: 0.39 X10(3) UL (ref 0.1–1)
MONOCYTES NFR BLD AUTO: 4 %
NEUTROPHILS # BLD AUTO: 8.28 X10 (3) UL (ref 1.5–7.7)
NEUTROPHILS # BLD AUTO: 8.28 X10(3) UL (ref 1.5–7.7)
NEUTROPHILS NFR BLD AUTO: 85 %
OSMOLALITY SERPL CALC.SUM OF ELEC: 280 MOSM/KG (ref 275–295)
PLATELET # BLD AUTO: 457 10(3)UL (ref 150–450)
POTASSIUM SERPL-SCNC: 3.5 MMOL/L (ref 3.5–5.1)
POTASSIUM SERPL-SCNC: 3.5 MMOL/L (ref 3.5–5.1)
RBC # BLD AUTO: 2.69 X10(6)UL
SODIUM SERPL-SCNC: 134 MMOL/L (ref 136–145)
WBC # BLD AUTO: 9.8 X10(3) UL (ref 4–11)

## 2024-08-22 PROCEDURE — 99232 SBSQ HOSP IP/OBS MODERATE 35: CPT | Performed by: INTERNAL MEDICINE

## 2024-08-22 PROCEDURE — 99233 SBSQ HOSP IP/OBS HIGH 50: CPT | Performed by: INTERNAL MEDICINE

## 2024-08-22 NOTE — PLAN OF CARE
Problem: Patient/Family Goals  Goal: Patient/Family Long Term Goal  Description: Patient's Long Term Goal:     Interventions:  -   - See additional Care Plan goals for specific interventions  Outcome: Progressing  Goal: Patient/Family Short Term Goal  Description: Patient's Short Term Goal:     Interventions:   -   - See additional Care Plan goals for specific interventions  Outcome: Progressing     Problem: GASTROINTESTINAL - ADULT  Goal: Minimal or absence of nausea and vomiting  Description: INTERVENTIONS:  - Maintain adequate hydration with IV or PO as ordered and tolerated  - Nasogastric tube to low intermittent suction as ordered  - Evaluate effectiveness of ordered antiemetic medications  - Provide nonpharmacologic comfort measures as appropriate  - Advance diet as tolerated, if ordered  - Obtain nutritional consult as needed  - Evaluate fluid balance  Outcome: Progressing  Goal: Maintains or returns to baseline bowel function  Description: INTERVENTIONS:  - Assess bowel function  - Maintain adequate hydration with IV or PO as ordered and tolerated  - Evaluate effectiveness of GI medications  - Encourage mobilization and activity  - Obtain nutritional consult as needed  - Establish a toileting routine/schedule  - Consider collaborating with pharmacy to review patient's medication profile  Outcome: Progressing  Goal: Maintains adequate nutritional intake (undernourished)  Description: INTERVENTIONS:  - Monitor percentage of each meal consumed  - Identify factors contributing to decreased intake, treat as appropriate  - Assist with meals as needed  - Monitor I&O, WT and lab values  - Obtain nutritional consult as needed  - Optimize oral hygiene and moisture  - Encourage food from home; allow for food preferences  - Enhance eating environment  Outcome: Progressing  Goal: Achieves appropriate nutritional intake (bariatric)  Description: INTERVENTIONS:  - Monitor for over-consumption  - Identify factors  contributing to increased intake, treat as appropriate  - Monitor I&O, WT and lab values  - Obtain nutritional consult as needed  - Evaluate psychosocial factors contributing to over-consumption  Outcome: Progressing    Patient alert and oriented, ambulates frequently on unit without complication. Tolerates small amount of PO intake, denies any nausea. Patient weaned to room air. Safety precautions in place. Patient and family updated and in agreement with POC.

## 2024-08-22 NOTE — CDS QUERY
Clarification for Documentation Specificity  CLINICAL DOCUMENTATION CLARIFICATION FORM  Dear Doctor Chas:  PLEASE (X) ALL THAT APPLY TO THE PRESCRIPTION OF: Pulmonary Edema  ____________________________   SELECTION BY PROVIDER ONLY  (  x) Acute Pulmonary Edema  (  ) Chronic Pulmonary Edema  (  ) Other (please specify):     CLINICAL INFORMATION FROM THE MEDICAL RECORD  Clinical Indicators:  8/21 Progress note: “was more short of breath yesterday, received on dose of Lasix- Xray with pulm edema  8/21 EGD with multiple deep duodenal ulcers-now on 4L O2, CXR with mild pulmonary edema  Small left pleural effusion/atelectasis  Pneumonia    Risk Factors:  Malignant ascites  Metastatic breast cancer to liver    Treatments:  CT Abdomen  Chest Xray  IV Lasix      If you have any questions, please contact Clinical : Karla Perkins RN CDS  at [email or web address]  Thank You!    THIS FORM IS A PERMANENT PART OF THE MEDICAL RECORD

## 2024-08-22 NOTE — PROGRESS NOTES
Inpatient Follow up Note    Andree Madelyn Patient Status:  Inpatient    3/1/1935 MRN [id number]   [facility] 4NW-A Attending Yo Doherty MD   Hosp Day # 4 PCP Gena Rivas     Reason for Consultation   Nausea vomiting  Duodenal ulcers     Subjective     -Breathing improved, off oxygen  -Abdomen also feels less distended  -Tolerating small amounts of solids, keeping down liquids, had 3 ensure yesterday  -Had one bowel movement today, no diarrhea, no melena           Objective:     /57   Pulse 72   Temp 98.4 °F (36.9 °C) (Oral)   Resp 18   Ht 5' 5\" (1.651 m)   Wt 127 lb (57.6 kg)   SpO2 91%   BMI 21.13 kg/m²   Gen: AAO x2. Improved breathing  CV: RRR with normal S1 / S2  Resp: Decreased breath sounds at bases  Abd: (+)BS, distended, suture material at site of abdominal scar-midline leesa-umbilical.  no rebound or guarding  Ext: No edema or cyanosis  Skin: Warm and dry     Labs/Imaging     LABRCNTIP[PGLU:5@  Recent Labs   Lab 24  0631   INR 0.99         Recent Labs   Lab 24  0615 24  1255 24  0805 24  1304 24  0729   WBC 11.9* 12.2* 9.1 10.4 9.8   HGB 8.9* 8.3* 8.0* 10.1* 8.9*   .0 339.0 337.0 452.0* 457.0*       Recent Labs   Lab 24  0631 24  0615 24  0805 24  0959 24  0700   * 133* 136 134* 134*   K 3.7 3.3* 3.6  3.6 3.1* 3.5  3.5    105 106 103 104   CO2 25.0 25.0 26.0 24.0 28.0   BUN 22 17 14 15 17   CREATSERUM 0.77 0.56 0.52* 0.58 0.57       Recent Labs   Lab 24  0631 24  0615 24  0805   ALT 81* 56* 48   * 76* 65*     XR CHEST AP PORTABLE  (CPT=71045)    Result Date: 2024  CONCLUSION:  Evidence of mild pulmonary edema, pulmonary vascular congestion is with mild bibasilar effusions.   LOCATION:  [facility]      Dictated by (CST): Susannah Gutierrez DO on 2024 at 6:21 PM     Finalized by (CST): Susannah Gutierrez DO on 2024 at 6:21 PM       MRI BRAIN  (W+WO) (CPT=70553)    Result Date: 8/19/2024  CONCLUSION:   1. No acute intracranial abnormality identified.  No specific findings to suggest intracranial metastatic disease.  2. Moderate chronic microvascular ischemic changes in the cerebral white matter and lucila.   LOCATION:  [facility]   Dictated by (CST): Papito Oneal MD on 8/19/2024 at 11:23 PM     Finalized by (CST): Papito Oneal MD on 8/19/2024 at 11:25 PM        AST   Date/Time Value Ref Range Status   08/20/2024 08:05 AM 65 (H) <34 U/L Final     ALT   Date/Time Value Ref Range Status   08/20/2024 08:05 AM 48 10 - 49 U/L Final     BUN   Date/Time Value Ref Range Status   08/22/2024 07:00 AM 17 9 - 23 mg/dL Final       Gastric biopsies-negative for H.pylori       Assessment     88 y/o female with a h/o metastatic breast CA admitted with nausea, vomiting related to severe esophagitis, multiple duodenal ulcers  H/o recent duodenal ulcer perforation Status post Jeff patch repair-negative for H.pylori on biopsy  Pulmonary edema  Symptomatic malignant ascites         Plan     Continue PPI bid for 8 weeks then daily indefinitely  Soft diet  Diuresis per primary team  GI will follow peripherally. If family wishes to pursue care in [facility], recommend office visit with  NP in 3-4 weeks         Nelson Perez MD  1:43 PM  8/22/2024  [facility]  [phone]

## 2024-08-22 NOTE — PROGRESS NOTES
Hematology/Oncology Progress Note    Patient Name: Andree Joshi  Medical Record Number: [id number]    YOB: 1935     Reason for Consultation:  Andree Joshi was seen today for the diagnosis of metastatic breast cancer    Interval events:      - off O2  - nausea controlled  - she is wondering if she can go to rehab for a few weeks to try to get stronger. She does not feel like she is able to stay home yet  - still trying to figure out if she will continue with Dr Desir for care as she now lives with her daughter in [facility] going back to her home in [place]    Inpatient Meds:   pantoprazole  40 mg Oral BID AC    furosemide  20 mg Intravenous Daily    sodium ferric gluconate  125 mg Intravenous Daily    folic acid  1 mg Oral Daily    levothyroxine  100 mcg Oral Before breakfast    ampicillin-sulbactam  3 g Intravenous q6h           PRN Meds:    alum-mag hydroxide-simethicone    acetaminophen    melatonin    glycerin-hypromellose-    sodium chloride    ondansetron    metoclopramide    polyethylene glycol (PEG 3350)    sennosides    bisacodyl    fleet enema    Allergies:   No Known Allergies    Vital Signs:  Height: --  Weight: --  BSA (Calculated - sq m): --  Pulse: 78 (08/22 0459)  BP: 115/61 (08/22 0459)  Temp: 98 °F (36.7 °C) (08/22 0459)  Do Not Use - Resp Rate: --  SpO2: 91 % (08/22 0719)    Wt Readings from Last 6 Encounters:   08/18/24 57.6 kg (127 lb)   08/19/24 57.6 kg (127 lb)       Physical Examination:  General: Patient is alert and oriented, not in acute distress  Psych: Mood and affect are appropriate  Eyes: EOMI, PERRL  ENT: Oropharynx is clear, no adenopathy  CV:no LE edema  Respiratory: Non-labored respirations  GI/Abd: Soft, non-tender, non-distended  Neurological: Grossly intact   Skin: no rashes or petechiae    Laboratory:  Recent Labs   Lab 08/19/24  0615 08/19/24  1255 08/20/24  0805 08/21/24  1304   WBC 11.9* 12.2* 9.1 10.4   HGB 8.9* 8.3* 8.0* 10.1*   HCT 24.9*  25.1* 23.0* 29.1*   .0 339.0 337.0 452.0*   MCV 93.3 100.4* 95.4 95.7   RDW 14.6 15.3 14.7 14.9   NEPRELIM 10.90* 11.10*  --  9.13*       Recent Labs   Lab 08/18/24  0631 08/19/24  0615 08/20/24  0805 08/21/24  0959   * 133* 136 134*   K 3.7 3.3* 3.6  3.6 3.1*    105 106 103   CO2 25.0 25.0 26.0 24.0   BUN 22 17 14 15   CREATSERUM 0.77 0.56 0.52* 0.58   * 155* 103* 136*   CA 9.1 7.8* 7.8* 8.0*   TP 7.1 5.5* 5.2*  --    ALB 3.9 3.1* 2.9*  --    ALKPHO 790* 548* 457*  --    * 76* 65*  --    ALT 81* 56* 48  --    BILT 0.5 0.3 0.3  --        Recent Labs   Lab 08/18/24  0631   INR 0.99   PTT 25.7       Impression & Plan:     Metastatic breast cancer to liver  - prior tx history reviewed; had progression on letrozole, now on fulvestrant with plan to add ribociclib later when performance status improves  - brain MRI negative for intracranial metastases  - pt improving, likely wants continue treatment. Discussed we would be happy to assume her care here at [place] with one of my partners who specializes in breast cancer vs continuing with Dr Desir for her cancer care  - discussed chemo treatment will be on hold if she is in rehab    Physical deconditioning  - PT/OT consult for rehab suitability    Duodenal ulcers  - EGD with persistent duodenal ulcers, now on high dose PPI    Nausea  - controlled. She should go out on zofran + compazine PRN prescriptions    Anemia  - iron studies consistent with anemia of chronic disease. Has folic acid deficiency; continue folic acid 1mg daily     Advance care planning  - we discussed code status. She endorsed DNAR/select.    Olayinka Dejesus MD  Hematology/Medical Oncology  [facility]

## 2024-08-22 NOTE — PROGRESS NOTES
[facility]   part of [facility]     Hospitalist Progress Note     Andree Joshi Patient Status:  Inpatient    3/1/1935 MRN [id number]   Location [facility] 4NW-A Attending Joel Jaime MD   Hosp Day # 4 PCP Gean Rivas     Chief Complaint: vomiting    Subjective:     Patient with continued O2 needs. Breathing stable. No CP. No abd pain. Had large black BM this morning and now feels better. Off O2. Still not eating much yet.     Objective:    Review of Systems:   ROS completed; pertinent positive and negatives stated in subjective.    Vital signs:  Temp:  [97.4 °F (36.3 °C)-98.4 °F (36.9 °C)] 98.4 °F (36.9 °C)  Pulse:  [65-89] 72  Resp:  [18-22] 18  BP: (112-120)/(57-66) 119/57  SpO2:  [91 %-94 %] 91 %    Physical Exam:    General: No acute distress  Respiratory: Clear to auscultation bilaterally  Cardiovascular: S1, S2.  Abdomen: Soft, non tender  Neuro: No new focal deficits  Ext: trace edema      Diagnostic Data:    Labs:  Recent Labs   Lab 24  0631 24  0615 24  1255 24  0805 24  1304 24  0729   WBC 8.5 11.9* 12.2* 9.1 10.4 9.8   HGB 10.9* 8.9* 8.3* 8.0* 10.1* 8.9*   MCV 93.5 93.3 100.4* 95.4 95.7 94.1   .0* 341.0 339.0 337.0 452.0* 457.0*   INR 0.99  --   --   --   --   --        Recent Labs   Lab 24  0631 24  0615 24  0805 24  0959 24  0700   * 155* 103* 136* 106*   BUN 22 17 14 15 17   CREATSERUM 0.77 0.56 0.52* 0.58 0.57   CA 9.1 7.8* 7.8* 8.0* 8.1*   ALB 3.9 3.1* 2.9*  --   --    * 133* 136 134* 134*   K 3.7 3.3* 3.6  3.6 3.1* 3.5  3.5    105 106 103 104   CO2 25.0 25.0 26.0 24.0 28.0   ALKPHO 790* 548* 457*  --   --    * 76* 65*  --   --    ALT 81* 56* 48  --   --    BILT 0.5 0.3 0.3  --   --    TP 7.1 5.5* 5.2*  --   --        Estimated Creatinine Clearance: 61.4 mL/min (based on SCr of 0.57 mg/dL).     No results for input(s): \"TROP\", \"TROPHS\", \"CK\" in the last 168  hours.    Recent Labs   Lab 08/18/24  0631   PTP 13.1   INR 0.99              Imaging: Imaging data reviewed in Epic.    Medications:    pantoprazole  40 mg Oral BID AC    furosemide  20 mg Intravenous Daily    folic acid  1 mg Oral Daily    levothyroxine  100 mcg Oral Before breakfast    ampicillin-sulbactam  3 g Intravenous q6h       Assessment & Plan:     #Sepsis  BCX NTD  Minimize IVF given risk of fluid OL     #PNA  IV ABX  Plan on 5 days of therapy     #Recent perforated duodenal ulcer/PUD  Cont. IV PPI BID  EGD with multiple large ulcers  Encourage oral intake today.     #Diarrhea  Resolved  Stool studies it re-occurs    #Anemia  IV iron    #Metastatic Stage IV BRCA  Follows with onc as OP  On palliative therapy  MRI brain neg for METS  PT OT eval today     #Pre DM with stress hyperglycemia  Likely stress/infection related  A1c 5.7     #Transaminase elevation  Due to liver mets  Monitor  Lasix for ascities, can transition to PO on DC     #Hypothyroid     #AOCD        Yo Doherty MD'      Supplementary Documentation:     Quality:    DVT Mechanical Prophylaxis:     Early ambuation  DVT Pharmacologic Prophylaxis   Medication   None                Code Status: DNAR/Selective Treatment  Mandujano: No urinary catheter in place  Mandujano Duration (in days):   Central line:    NURY:       Discharge is dependent on: clinical stability  At this point Ms. Joshi is expected to be discharge to: home    The 21st Century Cures Act makes medical notes like these available to patients in the interest of transparency. Please be advised this is a medical document. Medical documents are intended to carry relevant information, facts as evident, and the clinical opinion of the practitioner. The medical note is intended as peer to peer communication and may appear blunt or direct. It is written in medical language and may contain abbreviations or verbiage that are unfamiliar.     Dietitian Malnutrition  Assessment          Evaluation for Malnutrition: Criteria for severe malnutrition diagnosis- chronic illness related to   Energy intake less than 75% for greater than 1 month., Body fat severe depletion., Muscle mass severe depletion.               RD Malnutrition Care Plan: See RD nutrition assessment for additional recommendations.    Body Fat/Muscle Mass:          Physician Assessment    Patient has a diagnosis of severe malnutrition

## 2024-08-23 PROCEDURE — 99232 SBSQ HOSP IP/OBS MODERATE 35: CPT | Performed by: INTERNAL MEDICINE

## 2024-08-23 PROCEDURE — 99233 SBSQ HOSP IP/OBS HIGH 50: CPT | Performed by: INTERNAL MEDICINE

## 2024-08-23 RX ORDER — OLANZAPINE 2.5 MG/1
2.5 TABLET, FILM COATED ORAL NIGHTLY
Status: DISCONTINUED | OUTPATIENT
Start: 2024-08-23 | End: 2024-08-25

## 2024-08-23 NOTE — PLAN OF CARE
Received pt a/o x4. On 2L nc while sleeping. Afebrile. Reporting mild abdominal pain & \"pressure/ tightness\". Medication admin per MAR. Ambulating with walker. Call light within reach. Safety precautions in place.     Problem: GASTROINTESTINAL - ADULT  Goal: Minimal or absence of nausea and vomiting  Description: INTERVENTIONS:  - Maintain adequate hydration with IV or PO as ordered and tolerated  - Nasogastric tube to low intermittent suction as ordered  - Evaluate effectiveness of ordered antiemetic medications  - Provide nonpharmacologic comfort measures as appropriate  - Advance diet as tolerated, if ordered  - Obtain nutritional consult as needed  - Evaluate fluid balance  Outcome: Progressing     Problem: GASTROINTESTINAL - ADULT  Goal: Maintains or returns to baseline bowel function  Description: INTERVENTIONS:  - Assess bowel function  - Maintain adequate hydration with IV or PO as ordered and tolerated  - Evaluate effectiveness of GI medications  - Encourage mobilization and activity  - Obtain nutritional consult as needed  - Establish a toileting routine/schedule  - Consider collaborating with pharmacy to review patient's medication profile  Outcome: Progressing

## 2024-08-23 NOTE — PROGRESS NOTES
Hematology/Oncology Progress Note    Patient Name: Andree Joshi  Medical Record Number: [id number]    YOB: 1935     Reason for Consultation:  Andree Joshi was seen today for the diagnosis of metastatic breast cancer    Interval events:      - on 2L O2 overnight  - still has poor appetite. Nausea controlled but 'still there'. Knows she has to eat  - waiting for PT/OT  - still has not made a decision about whether to continue her care at [facility] vs establishing care here; she is still deciding where she wants to live    Inpatient Meds:   OLANZapine  2.5 mg Oral Nightly    pantoprazole  40 mg Oral BID AC    furosemide  20 mg Intravenous Daily    folic acid  1 mg Oral Daily    levothyroxine  100 mcg Oral Before breakfast           PRN Meds:    alum-mag hydroxide-simethicone    acetaminophen    melatonin    glycerin-hypromellose-    sodium chloride    ondansetron    metoclopramide    polyethylene glycol (PEG 3350)    sennosides    bisacodyl    fleet enema    Allergies:   No Known Allergies    Vital Signs:  Height: --  Weight: --  BSA (Calculated - sq m): --  Pulse: 86 (08/23 0730)  BP: 110/60 (08/23 0730)  Temp: 98.3 °F (36.8 °C) (08/23 0730)  Do Not Use - Resp Rate: --  SpO2: 93 % (08/23 0730)    Wt Readings from Last 6 Encounters:   08/18/24 57.6 kg (127 lb)   08/19/24 57.6 kg (127 lb)       Physical Examination:  General: Patient is alert and oriented, not in acute distress  Psych: Mood and affect are appropriate  Eyes: EOMI, PERRL  ENT: Oropharynx is clear, no adenopathy  CV:no LE edema  Respiratory: Non-labored respirations  GI/Abd: Soft, non-tender, non-distended  Neurological: Grossly intact   Skin: no rashes or petechiae    Laboratory:  Recent Labs   Lab 08/19/24  1255 08/20/24  0805 08/21/24  1304 08/22/24  0729   WBC 12.2* 9.1 10.4 9.8   HGB 8.3* 8.0* 10.1* 8.9*   HCT 25.1* 23.0* 29.1* 25.3*   .0 337.0 452.0* 457.0*   .4* 95.4 95.7 94.1   RDW 15.3 14.7 14.9 14.4    NEPRELIM 11.10*  --  9.13* 8.28*       Recent Labs   Lab 08/18/24  0631 08/19/24  0615 08/20/24  0805 08/21/24  0959 08/22/24  0700   * 133* 136 134* 134*   K 3.7 3.3* 3.6  3.6 3.1* 3.5  3.5    105 106 103 104   CO2 25.0 25.0 26.0 24.0 28.0   BUN 22 17 14 15 17   CREATSERUM 0.77 0.56 0.52* 0.58 0.57   * 155* 103* 136* 106*   CA 9.1 7.8* 7.8* 8.0* 8.1*   TP 7.1 5.5* 5.2*  --   --    ALB 3.9 3.1* 2.9*  --   --    ALKPHO 790* 548* 457*  --   --    * 76* 65*  --   --    ALT 81* 56* 48  --   --    BILT 0.5 0.3 0.3  --   --        Recent Labs   Lab 08/18/24  0631   INR 0.99   PTT 25.7       Impression & Plan:     Metastatic breast cancer to liver  - prior tx history reviewed; had progression on letrozole, now on fulvestrant with plan to add ribociclib later when performance status improves  - brain MRI negative for intracranial metastases  - pt improving, likely wants continue treatment. Discussed we would be happy to assume her care here at [place] with one of my partners who specializes in breast cancer vs continuing with Dr Desir for her cancer care; she is still deciding where she wants to live  - discussed chemo treatment will be on hold if she is in rehab    Physical deconditioning  - PT/OT consult for rehab suitability    Duodenal ulcers  - EGD with persistent duodenal ulcers, now on high dose PPI    Nausea.   Poor appetite  - controlled. She should go out on zofran + compazine PRN prescriptions  - will start 2.5mg nightly olanzapine to aid with nausea + appetite stimulation    Anemia  - iron studies consistent with anemia of chronic disease. Has folic acid deficiency; continue folic acid 1mg daily     Advance care planning  - we discussed code status. She endorsed DNAR/select.    Will sign off. Ok to DC from heme/onc standpoint if no other inpt issues. Pt to decide if she wants to follow up with us vs Dr Desir.    Olayinka Dejesus MD  Hematology/Medical Oncology  [facility]

## 2024-08-23 NOTE — PLAN OF CARE
Pt. A&O x4. VSS. Afebrile. No c/o pain. Pt. Continues to have poor appetite and feeling \"full\" all the time. Pt. Able to ambulate with the walker in the hallway multiple times today. Call light within reach.    Problem: GASTROINTESTINAL - ADULT  Goal: Minimal or absence of nausea and vomiting  Description: INTERVENTIONS:  - Maintain adequate hydration with IV or PO as ordered and tolerated  - Nasogastric tube to low intermittent suction as ordered  - Evaluate effectiveness of ordered antiemetic medications  - Provide nonpharmacologic comfort measures as appropriate  - Advance diet as tolerated, if ordered  - Obtain nutritional consult as needed  - Evaluate fluid balance  Outcome: Progressing     Problem: GASTROINTESTINAL - ADULT  Goal: Maintains or returns to baseline bowel function  Description: INTERVENTIONS:  - Assess bowel function  - Maintain adequate hydration with IV or PO as ordered and tolerated  - Evaluate effectiveness of GI medications  - Encourage mobilization and activity  - Obtain nutritional consult as needed  - Establish a toileting routine/schedule  - Consider collaborating with pharmacy to review patient's medication profile  Outcome: Progressing     Problem: GASTROINTESTINAL - ADULT  Goal: Maintains adequate nutritional intake (undernourished)  Description: INTERVENTIONS:  - Monitor percentage of each meal consumed  - Identify factors contributing to decreased intake, treat as appropriate  - Assist with meals as needed  - Monitor I&O, WT and lab values  - Obtain nutritional consult as needed  - Optimize oral hygiene and moisture  - Encourage food from home; allow for food preferences  - Enhance eating environment  Outcome: Progressing

## 2024-08-23 NOTE — CM/SW NOTE
SW met with patient and her daughter at bedside to discuss home vs. [facility] placement. SW informed patient that she is currently not qualifying for rehab placement and encouraged patient by discussing option for home health services which is already arranged with [facility].     Patient and daughter had questions regarding community services and hiring a caregiver. SW provided them with information for [facility] and also encouraged them to reach out to [facility] services for meals on wheels information.     SW will continue to follow for plan of care changes and remain available for any additional DC needs or concerns.     Pauline Maynard MSW, LSW  Discharge Planner   [phone]

## 2024-08-23 NOTE — DISCHARGE INSTRUCTIONS
Sometimes managing your health at home requires assistance.  The [facility] team has recognized your preference to use [facility] Phone: [phone]. A representative from the home health agency will contact you or your family to schedule your first visit.

## 2024-08-23 NOTE — OCCUPATIONAL THERAPY NOTE
OCCUPATIONAL THERAPY EVALUATION - INPATIENT    Room Number: 427/427-A  Evaluation Date: 8/23/2024     Type of Evaluation: Initial  Presenting Problem: nausea and vomiting and diarrhea, malignant neoplasm of breast, pleural effusion, anemia, acute gastritis without hemorrhage    Physician Order: IP Consult to Occupational Therapy  Reason for Therapy:  ADL/IADL Dysfunction and Discharge Planning      OCCUPATIONAL THERAPY ASSESSMENT   Patient is a 89 year old female admitted on 8/18/2024 with Presenting Problem: nausea and vomiting and diarrhea, malignant neoplasm of breast, pleural effusion, anemia, acute gastritis without hemorrhage. Co-Morbidities : cancer, stomach ulcers  Patient is currently functioning near baseline with self-care and functional mobility.  Prior to admission, patient's baseline is independent.  Patient met all OT goals at supervision level.  Patient reports no further questions/concerns at this time.     Patient will benefit from continued skilled OT Services at discharge to promote prior level of function and safety with additional support and return home with home health OT to address home safety, modifications, equipment needs and further education.      WEIGHT BEARING RESTRICTION  Weight Bearing Restriction: None                Recommendations for nursing staff:   Transfers: w/ RW and supervision  Toileting location: Toilet    EVALUATION SESSION:  Patient at start of session: chair  FUNCTIONAL TRANSFER ASSESSMENT  Sit to Stand: Edge of Bed; Chair  Edge of Bed: Modified Independent  Chair: Modified Independent  Toilet Transfer: Modified Independent    BED MOBILITY  Rolling: Modified Independent  Supine to Sit : Modified Independent  Sit to Supine (OT): Modified Independent  Scooting: Mod i    BALANCE ASSESSMENT  Static Sitting: Modified Independent  Sitting Bilateral: Modified Independent  Static Standing: Modified Independent  Standing Bilateral: Supervision    FUNCTIONAL ADL  ASSESSMENT  Eating: Modified Independent  Grooming Standing: Supervision  LB Dressing Seated: Supervision  Toileting Seated: Supervision      ACTIVITY TOLERANCE: WFL                         O2 SATURATIONS   94%    COGNITION  Overall Cognitive Status:  WFL - within functional limits  COGNITION ASSESSMENTS       Upper Extremity:   ROM: within functional limits   Strength: is within functional limits   Coordination:  Gross motor: WFL  Fine motor: WFL  Sensation: light touch intact BUE    EDUCATION PROVIDED  Patient: Role of Occupational Therapy; Plan of Care; Discharge Recommendations; Adaptive Equipment Recommendations; DME Recommendations; Functional Transfer Techniques; Fall Prevention; Edema Reduction; Posture/Positioning; Energy Conservation; Compensatory ADL Techniques; Proper Body Mechanics  Patient's Response to Education: Verbalized Understanding; Returned Demonstration  Family/Caregiver: Role of Occupational Therapy; Plan of Care  Family/Caregiver's Response to Education: Verbalized Understanding    Equipment used: RW  Demonstrates functional use    Therapist comments: OT educated patient on safety,  sequencing, energy conservation, pain management, home modifications and adaptive equipment to increase independence with ADLs.    Vision WFL; no c/o  Sensation: light touch intact w/ no c/o acute changes    Educated on energy conservation, work simplification and adaptive techniques.  Patient able to verbalize 3 strategies to complete ADL while incorporating modified strategies.  Extensive discussion w/ patient and family regarding equipment, safety, pacing, energy conservation, therapy needs at discharge.  All verbalized understanding.  Discussed with RN, SW and PT    Patient End of Session: With  staff;Needs met;Call light within reach;RN aware of session/findings;All patient questions and concerns addressed;In bed;Family present;Discussed recommendations with /    OCCUPATIONAL  PROFILE    HOME SITUATION  Type of Home: House  Home Layout: Two level (w/ no bathroom on first floor)  Lives With: Spouse    Toilet and Equipment: Standard height toilet  Shower/Tub and Equipment: Walk-in shower  Other Equipment: None    Occupation/Status: retired  Hand Dominance: Right  Drives: Yes  Patient Regularly Uses: Reading glasses    Prior Level of Function: independent with all ADLs and functional mobility without use of adaptive device.  Patient has been staying with daughter and son in law in this area x 3 weeks d/t having difficulty in her own home w/ having to go up/down stairs several times a day for bathroom use as there is no toilet on main floor of home.  Patient reports no falls.  Prior to sx 6 weeks ago, patient reports she was very active,swimming daily, golfing, cutting own grass.    SUBJECTIVE  \"My  and I didn't plan well for this stage.\"    PAIN ASSESSMENT  Ratin  Location: denies       OBJECTIVE  Precautions: Bed/chair alarm  Fall Risk: High fall risk    WEIGHT BEARING RESTRICTION  Weight Bearing Restriction: None                AM-PAC ‘6-Clicks’ Inpatient Daily Activity Short Form  -   Putting on and taking off regular lower body clothing?: None  -   Bathing (including washing, rinsing, drying)?: None  -   Toileting, which includes using toilet, bedpan or urinal? : None  -   Putting on and taking off regular upper body clothing?: None  -   Taking care of personal grooming such as brushing teeth?: None  -   Eating meals?: None    AM-PAC Score:  Score: 24  Approx Degree of Impairment: 0%  Standardized Score (AM-PAC Scale): 57.54      ADDITIONAL TESTS     NEUROLOGICAL FINDINGS        PLAN   Patient has been evaluated and presents with no skilled Occupational Therapy needs at this time.  Patient discharged from Occupational Therapy services.  Please re-order if a new functional limitation presents during this admission.      Patient Evaluation Complexity Level:   Occupational  Profile/Medical History LOW - Brief history including review of medical or therapy records    Specific performance deficits impacting engagement in ADL/IADL LOW  1 - 3 performance deficits    Client Assessment/Performance Deficits LOW - No comorbidities nor modifications of tasks    Clinical Decision Making LOW - Analysis of occupational profile, problem-focused assessments, limited treatment options    Overall Complexity LOW     OT Session Time: 30 minutes  Self-Care Home Management: 15 minutes

## 2024-08-23 NOTE — CM/SW NOTE
SW sent updated clinical information to [facility] for eventual patient admission. SW will continue to follow for medical clearance.     SW will continue to follow for plan of care changes and remain available for any additional DC needs or concerns.     Pauline Maynard MSW, LSW  Discharge Planner   [phone]

## 2024-08-23 NOTE — PHYSICAL THERAPY NOTE
PHYSICAL THERAPY EVALUATION - INPATIENT     Room Number: 427/427-A  Evaluation Date: 8/23/2024  Type of Evaluation: Initial  Physician Order: PT Eval and Treat    Presenting Problem: n/v/d  Co-Morbidities : h/o breast CA  Reason for Therapy: Mobility Dysfunction and Discharge Planning    PHYSICAL THERAPY ASSESSMENT   Patient is currently functioning near baseline with bed mobility, transfers, gait, and stair negotiation.  Prior to admission, patient's baseline is independent.  Patient is requiring supervision as a result of the following impairments: decreased endurance/aerobic capacity. Physical Therapy will continue to follow for duration of hospitalization.      Patient will benefit from continued skilled PT Services at discharge to promote prior level of function and safety with additional support and return home with home health PT.    PLAN  PT Treatment Plan: Bed mobility;Body mechanics;Endurance;Energy conservation;Patient education;Family education;Gait training;Range of motion;Stair training;Transfer training;Balance training  Rehab Potential : Good  Frequency (Obs):  (1-2x/week)  Number of Visits to Meet Established Goals: 2      CURRENT GOALS    Goal #1    Goal #2 Patient will negotiate 1 flight of stairs with supervision to ensure safety at [place].   Goal #3 Patient is able to ambulate 300 feet with assist device:  least restrictive device  at assistance level: modified independent     Goal #4    Goal #5    Goal #6    Goal Comments: Goals established on 8/23/2024      PHYSICAL THERAPY MEDICAL/SOCIAL HISTORY  History related to current admission: Patient is a 89 year old female admitted on 8/18/2024 from home for N/V/D.      HOME SITUATION  Type of Home: House   Home Layout: Two level        Stairs to Bedroom: 13  Railing: Yes    Lives With: Spouse             Prior Level of [place]: 1 year prior, pt highly ind, was swimming, cutting the grass, etc.  Since recent sx, pt has been staying with her daughter  as less stairs.  Pt's bathroom at home located on 2nd level or basement.  Pt is able to stay on 1 level at dtr Liana's home.    SUBJECTIVE  \"If I drop something, I can pick it up like this.\"        OBJECTIVE  Precautions: Bed/chair alarm  Fall Risk: High fall risk    WEIGHT BEARING RESTRICTION                   PAIN ASSESSMENT  Ratin          COGNITION  Overall Cognitive Status:  WFL - within functional limits    RANGE OF MOTION AND STRENGTH ASSESSMENT  Upper extremity ROM and strength are within functional limits   Lower extremity ROM is within functional limits     Lower extremity strength is within functional limits       BALANCE  Static Sitting: Good  Dynamic Sitting: Good  Static Standing: Fair +  Dynamic Standing: Fair +    ADDITIONAL TESTS                                    ACTIVITY TOLERANCE                         O2 WALK       NEUROLOGICAL FINDINGS                        AM-PAC '6-Clicks' INPATIENT SHORT FORM - BASIC MOBILITY  How much difficulty does the patient currently have...  Patient Difficulty: Turning over in bed (including adjusting bedclothes, sheets and blankets)?: None   Patient Difficulty: Sitting down on and standing up from a chair with arms (e.g., wheelchair, bedside commode, etc.): None   Patient Difficulty: Moving from lying on back to sitting on the side of the bed?: None   How much help from another person does the patient currently need...   Help from Another: Moving to and from a bed to a chair (including a wheelchair)?: None   Help from Another: Need to walk in hospital room?: None   Help from Another: Climbing 3-5 steps with a railing?: A Little       AM-PAC Score:  Raw Score: 23   Approx Degree of Impairment: 11.2%   Standardized Score (AM-PAC Scale): 56.93   CMS Modifier (G-Code): CI    FUNCTIONAL ABILITY STATUS  Gait Assessment   Functional Mobility/Gait Assessment  Gait Assistance: Supervision  Distance (ft): 400  Assistive Device: Rolling walker  Pattern:  (flexed  posture)    Skilled Therapy Provided     Bed Mobility:  Rolling: ind  Supine to sit: NT   Sit to supine: ind     Transfer Mobility:  Sit to stand: ind   Stand to sit: ind  Gait = Ambulation as above.  Trial x10ft without assistive device.  Wide LAI present.  Pt verbalizes agreement with improved quality of gait with RW.  Cues for repeated erect posture.      Therapist's Comments: Pt encouraged to be OOB to chair, to ambulate.  D/w pt and family re: RW for home use and continued therapy at dc location.    Exercise/Education Provided:  Bed mobility  Body mechanics  Functional activity tolerated  Gait training  Posture  Transfer training    Patient End of Session: In bed;Needs met;Call light within reach;RN aware of session/findings;All patient questions and concerns addressed;Family present;Discussed recommendations with /      Patient Evaluation Complexity Level:  History Moderate - 1 or 2 personal factors and/or co-morbidities   Examination of body systems Low -  addressing 1-2 elements   Clinical Presentation Low- Stable   Clinical Decision Making Low Complexity       PT Session Time: 20 minutes    Therapeutic Activity: 12 minutes

## 2024-08-23 NOTE — DIETARY NOTE
[facility]   part of [facility]    NUTRITION ASSESSMENT    Pt meets severe malnutrition criteria at this time.      CRITERIA FOR MALNUTRITION DIAGNOSIS:  Criteria for severe malnutrition diagnosis: chronic illness related to energy intake less than 75% for greater than 1 month, body fat severe depletion, and muscle mass severe depletion    NUTRITION INTERVENTION:    RD nutrition Care Plan- See RD nutrition assessment for additional recommendations  Meal and Snacks - Monitor and encourage adequate PO intake.   Medical Food Supplements - Ensure Plus High Protein TID Chocolate. Rationale/use for oral supplements discussed.  Nutrition Education - How to increase caloric intake NCM handout: suggested making ensure shake with ice cream and peanut butter increase fat and protein. Pt/family receptive to education, no barriers noted. Handouts provided.   Vitamin and Mineral Supplements - adding Multivitamin with minerals    PATIENT STATUS:   8/23- Pt up in bed. States appetite is still low. Taking Ensure 1-2 per day at best right now. Discussed high protein foods but pt unable to eat well due to continued decreased taste. Fruit sounds good but pt is on low fiber for ulcers. Encouraged canned fruits and no skins / seeds, like tomato. Pt willing to try Magic Shake, sent for lunch, made note under order to offer Magic shake or EPHP with meals. Pt / family stated they would not want to do a feeding tube, understandably. Follow per protocol.     08/21/24 89/F admitted d/t N/V/D. Symptoms found to be related to duodenal ulcers. Symptoms are currently resolved. She was admitted 5 weeks ago for burst ulcer and received surgery. Daughter at bedside claimed she has not eaten much for 6 wks and lost her taste 7-8 wks ago. Pt drinks Ensure max 3x/day at home and reports trying to eat whatever sounds good. Pt was given educational handout on how to increase calories. Pt offered EPHP TID and accepted. Pt claims her wt being  stable at 113 lbs with no recent s    ANTHROPOMETRICS:  Ht: 165.1 cm (5' 5\")  Wt: 57.6 kg (127 lb).   BMI: Body mass index is 21.13 kg/m².  IBW: 56.8 kg    WEIGHT HISTORY:   Weight loss: No pt normal wt is 113 lbs    Wt Readings from Last 10 Encounters:   08/18/24 57.6 kg (127 lb)   08/19/24 57.6 kg (127 lb)      NUTRITION:  Diet:       Procedures    Low Fiber/Soft diet Low Fiber/Soft; Is Patient on Accuchecks? No      Food Allergies: No  Cultural/Ethnic/Jew Preferences Addressed: Yes    Percent Meals Eaten (last 3 days)       Date/Time Percent Meals Eaten (%)    08/21/24 1215 10 %    08/21/24 1800 10 %          GI system review: constipation Last BM: 8/22  Skin and wounds: WNL    NUTRITION RELATED PHYSICAL FINDINGS:     1. Body Fat/Muscle Mass: severe depletion body fat Orbital fat pad and Triceps and severe muscle depletion Temple region, Clavicle region, and Shoulder/Acromion process     2. Fluid Accumulation: lower extremity edema     NUTRITION PRESCRIPTION: 56.8 kg (IBW)  Calories: 7994-4890 calories/day (30-35 kcal/kg)  Protein:  grams protein/day (1.3-1.8 grams protein per kg)  Fluid: ~1 ml/kcal or per MD discretion    NUTRITION DIAGNOSIS/PROBLEM:  Malnutrition related to insufficient appetite resulting in inadequate nutrition intake as evidenced by documented/reported insufficient oral intake, documented/reported unintentional weight loss, loss of fat mass, and loss of muscle mass    MONITOR AND EVALUATE/NUTRITION GOALS:  PO intake of 75% of meals TID - New  PO intake of 75% of oral nutrition supplement/s - New  Achieve and maintain dry wt +/- 1 to 2 lbs - New    MEDICATIONS:  Abx, lasix, Synthroid, Protonix, Kcl, Ferrlecit    LABS:  reviewed    Pt is at Moderate nutrition risk    Shahnaz Cervanets RD, LDN  Clinical Dietitian

## 2024-08-23 NOTE — PROGRESS NOTES
[facility]   part of [facility]     Hospitalist Progress Note     Andree Joshi Patient Status:  Inpatient    3/1/1935 MRN [id number]   Location [facility] 4NW-A Attending Joel Jaime MD   Hosp Day # 5 PCP Gena Rivas     Chief Complaint: vomiting    Subjective:     Patient without acute events overnight. On RA. Family at bedside. Still not eating much. Started on olanzapine for tonight for appetite stimulation.     Objective:    Review of Systems:   ROS completed; pertinent positive and negatives stated in subjective.    Vital signs:  Temp:  [98 °F (36.7 °C)-98.3 °F (36.8 °C)] 98.3 °F (36.8 °C)  Pulse:  [] 86  Resp:  [17-18] 17  BP: (108-121)/(60-67) 110/60  SpO2:  [90 %-93 %] 93 %    Physical Exam:    General: No acute distress  Respiratory: Clear to auscultation bilaterally  Cardiovascular: S1, S2.  Abdomen: Soft, non tender  Neuro: No new focal deficits  Ext: trace edema      Diagnostic Data:    Labs:  Recent Labs   Lab 24  0631 24  0615 24  1255 24  0805 24  1304 24  0729   WBC 8.5 11.9* 12.2* 9.1 10.4 9.8   HGB 10.9* 8.9* 8.3* 8.0* 10.1* 8.9*   MCV 93.5 93.3 100.4* 95.4 95.7 94.1   .0* 341.0 339.0 337.0 452.0* 457.0*   INR 0.99  --   --   --   --   --        Recent Labs   Lab 24  0631 24  0615 24  0805 24  0959 24  0700   * 155* 103* 136* 106*   BUN 22 17 14 15 17   CREATSERUM 0.77 0.56 0.52* 0.58 0.57   CA 9.1 7.8* 7.8* 8.0* 8.1*   ALB 3.9 3.1* 2.9*  --   --    * 133* 136 134* 134*   K 3.7 3.3* 3.6  3.6 3.1* 3.5  3.5    105 106 103 104   CO2 25.0 25.0 26.0 24.0 28.0   ALKPHO 790* 548* 457*  --   --    * 76* 65*  --   --    ALT 81* 56* 48  --   --    BILT 0.5 0.3 0.3  --   --    TP 7.1 5.5* 5.2*  --   --        Estimated Creatinine Clearance: 61.4 mL/min (based on SCr of 0.57 mg/dL).     No results for input(s): \"TROP\", \"TROPHS\", \"CK\" in the last 168 hours.    Recent Labs   Lab  08/18/24  0631   PTP 13.1   INR 0.99              Imaging: Imaging data reviewed in Epic.    Medications:    OLANZapine  2.5 mg Oral Nightly    pantoprazole  40 mg Oral BID AC    furosemide  20 mg Intravenous Daily    folic acid  1 mg Oral Daily    levothyroxine  100 mcg Oral Before breakfast       Assessment & Plan:     #Sepsis  BCX NTD  Minimize IVF given risk of fluid OL  Completed abx     #PNA  IV ABX  Plan on 5 days of therapy, completed     #Recent perforated duodenal ulcer/PUD  Cont. PPI BID  EGD with multiple large ulcers  Encourage oral intake today.   Olanzapine started    #Diarrhea  Resolved  Stool studies it re-occurs    #Anemia  IV iron    #Metastatic Stage IV BRCA  Follows with onc as OP  On palliative therapy  MRI brain neg for METS  PT OT eval today     #Pre DM with stress hyperglycemia  Likely stress/infection related  A1c 5.7     #Transaminase elevation  Due to liver mets  Monitor  Lasix for ascities, can transition to PO on DC     #Hypothyroid     #AOCD        Yo Doherty MD'      Supplementary Documentation:     Quality:    DVT Mechanical Prophylaxis:     Early ambuation  DVT Pharmacologic Prophylaxis   Medication   None                Code Status: DNAR/Selective Treatment  Mandujano: No urinary catheter in place  Mandujano Duration (in days):   Central line:    NURY: tmrw      Discharge is dependent on: clinical stability  At this point Ms. Joshi is expected to be discharge to: home    The 21st Century Cures Act makes medical notes like these available to patients in the interest of transparency. Please be advised this is a medical document. Medical documents are intended to carry relevant information, facts as evident, and the clinical opinion of the practitioner. The medical note is intended as peer to peer communication and may appear blunt or direct. It is written in medical language and may contain abbreviations or verbiage that are unfamiliar.     Dietitian Malnutrition  Assessment          Evaluation for Malnutrition: Criteria for severe malnutrition diagnosis- chronic illness related to   Energy intake less than 75% for greater than 1 month., Body fat severe depletion., Muscle mass severe depletion.               RD Malnutrition Care Plan: See RD nutrition assessment for additional recommendations.    Body Fat/Muscle Mass:          Physician Assessment    Patient has a diagnosis of severe malnutrition

## 2024-08-24 PROCEDURE — 99232 SBSQ HOSP IP/OBS MODERATE 35: CPT | Performed by: INTERNAL MEDICINE

## 2024-08-24 NOTE — PLAN OF CARE
Pt Aox4. VSS. RA.   No complain of pain N/V/D.   Poor appetite. Better per pt.  Abdominal distension makes it difficult for pt to eat due to feeling full.   Up walking with the walker.     US paracentesis ordered.  NPO after midnight. PT/INR in the am.     Problem: GASTROINTESTINAL - ADULT  Goal: Maintains or returns to baseline bowel function  Description: INTERVENTIONS:  - Assess bowel function  - Maintain adequate hydration with IV or PO as ordered and tolerated  - Evaluate effectiveness of GI medications  - Encourage mobilization and activity  - Obtain nutritional consult as needed  - Establish a toileting routine/schedule  - Consider collaborating with pharmacy to review patient's medication profile  Outcome: Completed     Problem: GASTROINTESTINAL - ADULT  Goal: Maintains adequate nutritional intake (undernourished)  Description: INTERVENTIONS:  - Monitor percentage of each meal consumed  - Identify factors contributing to decreased intake, treat as appropriate  - Assist with meals as needed  - Monitor I&O, WT and lab values  - Obtain nutritional consult as needed  - Optimize oral hygiene and moisture  - Encourage food from home; allow for food preferences  - Enhance eating environment  Outcome: Progressing     Problem: GASTROINTESTINAL - ADULT  Goal: Achieves appropriate nutritional intake (bariatric)  Description: INTERVENTIONS:  - Monitor for over-consumption  - Identify factors contributing to increased intake, treat as appropriate  - Monitor I&O, WT and lab values  - Obtain nutritional consult as needed  - Evaluate psychosocial factors contributing to over-consumption  Outcome: Progressing     Problem: GASTROINTESTINAL - ADULT  Goal: Minimal or absence of nausea and vomiting  Description: INTERVENTIONS:  - Maintain adequate hydration with IV or PO as ordered and tolerated  - Nasogastric tube to low intermittent suction as ordered  - Evaluate effectiveness of ordered antiemetic medications  - Provide  nonpharmacologic comfort measures as appropriate  - Advance diet as tolerated, if ordered  - Obtain nutritional consult as needed  - Evaluate fluid balance  Outcome: Completed

## 2024-08-24 NOTE — PLAN OF CARE
Received pt a/o x4. VSS. On 2L nc overnight. Afebrile. Denies pain. Abdomen more distended & firm this shift. Pt reporting \"pressure\" in abd. Medication administration per MAR. Call light within reach. Safety precautions in place.     Problem: GASTROINTESTINAL - ADULT  Goal: Minimal or absence of nausea and vomiting  Description: INTERVENTIONS:  - Maintain adequate hydration with IV or PO as ordered and tolerated  - Nasogastric tube to low intermittent suction as ordered  - Evaluate effectiveness of ordered antiemetic medications  - Provide nonpharmacologic comfort measures as appropriate  - Advance diet as tolerated, if ordered  - Obtain nutritional consult as needed  - Evaluate fluid balance  Outcome: Progressing     Problem: GASTROINTESTINAL - ADULT  Goal: Maintains or returns to baseline bowel function  Description: INTERVENTIONS:  - Assess bowel function  - Maintain adequate hydration with IV or PO as ordered and tolerated  - Evaluate effectiveness of GI medications  - Encourage mobilization and activity  - Obtain nutritional consult as needed  - Establish a toileting routine/schedule  - Consider collaborating with pharmacy to review patient's medication profile  Outcome: Progressing     Problem: GASTROINTESTINAL - ADULT  Goal: Maintains adequate nutritional intake (undernourished)  Description: INTERVENTIONS:  - Monitor percentage of each meal consumed  - Identify factors contributing to decreased intake, treat as appropriate  - Assist with meals as needed  - Monitor I&O, WT and lab values  - Obtain nutritional consult as needed  - Optimize oral hygiene and moisture  - Encourage food from home; allow for food preferences  - Enhance eating environment  Outcome: Progressing

## 2024-08-24 NOTE — PROGRESS NOTES
[facility]   part of [facility]     Hospitalist Progress Note     Andree Joshi Patient Status:  Inpatient    3/1/1935 MRN [id number]   Location [facility] 4NW-A Attending Joel Jaime MD   Hosp Day # 6 PCP Gena Rivas     Chief Complaint: vomiting    Subjective:     Patient without acute events overnight. On RA this morning. Abd feels more bloated this morning. Had BM and was able to eat more this morning.     Objective:    Review of Systems:   ROS completed; pertinent positive and negatives stated in subjective.    Vital signs:  Temp:  [98 °F (36.7 °C)-98.2 °F (36.8 °C)] 98.2 °F (36.8 °C)  Pulse:  [] 76  Resp:  [18] 18  BP: (114-118)/(51-65) 114/60  SpO2:  [93 %-95 %] 93 %    Physical Exam:    General: No acute distress  Respiratory: Clear to auscultation bilaterally  Cardiovascular: S1, S2.  Abdomen: Soft, non tender  Neuro: No new focal deficits  Ext: trace edema      Diagnostic Data:    Labs:  Recent Labs   Lab 24  0631 24  0615 24  1255 24  0805 24  1304 24  0729   WBC 8.5 11.9* 12.2* 9.1 10.4 9.8   HGB 10.9* 8.9* 8.3* 8.0* 10.1* 8.9*   MCV 93.5 93.3 100.4* 95.4 95.7 94.1   .0* 341.0 339.0 337.0 452.0* 457.0*   INR 0.99  --   --   --   --   --        Recent Labs   Lab 24  0631 24  0615 24  0805 24  0959 24  0700   * 155* 103* 136* 106*   BUN 22 17 14 15 17   CREATSERUM 0.77 0.56 0.52* 0.58 0.57   CA 9.1 7.8* 7.8* 8.0* 8.1*   ALB 3.9 3.1* 2.9*  --   --    * 133* 136 134* 134*   K 3.7 3.3* 3.6  3.6 3.1* 3.5  3.5    105 106 103 104   CO2 25.0 25.0 26.0 24.0 28.0   ALKPHO 790* 548* 457*  --   --    * 76* 65*  --   --    ALT 81* 56* 48  --   --    BILT 0.5 0.3 0.3  --   --    TP 7.1 5.5* 5.2*  --   --        Estimated Creatinine Clearance: 61.4 mL/min (based on SCr of 0.57 mg/dL).     No results for input(s): \"TROP\", \"TROPHS\", \"CK\" in the last 168 hours.    Recent Labs   Lab  08/18/24  0631   PTP 13.1   INR 0.99              Imaging: Imaging data reviewed in Epic.    Medications:    OLANZapine  2.5 mg Oral Nightly    pantoprazole  40 mg Oral BID AC    furosemide  20 mg Intravenous Daily    folic acid  1 mg Oral Daily    levothyroxine  100 mcg Oral Before breakfast       Assessment & Plan:     #Sepsis  BCX NTD  Minimize IVF given risk of fluid OL  Completed abx     #PNA  IV ABX  Plan on 5 days of therapy, completed     #Recent perforated duodenal ulcer/PUD  Cont. PPI BID  EGD with multiple large ulcers  Encourage oral intake today.   Olanzapine started    #Diarrhea  Resolved  Stool studies it re-occurs    #Anemia  IV iron    #Metastatic Stage IV BRCA  Follows with onc as OP  On palliative therapy  MRI brain neg for METS  PT OT eval today     #Pre DM with stress hyperglycemia  Likely stress/infection related  A1c 5.7     #Transaminase elevation  Due to liver mets  Monitor  Lasix for ascities, can transition to PO on DC  Plan for therapeutic paracentsis prior to DC     #Hypothyroid     #AOCD        Yo Doherty MD'      Supplementary Documentation:     Quality:    DVT Mechanical Prophylaxis:     Early ambuation  DVT Pharmacologic Prophylaxis   Medication   None                Code Status: DNAR/Selective Treatment  Mandujano: No urinary catheter in place  Mandujano Duration (in days):   Central line:    NURY: tmrw      Discharge is dependent on: clinical stability  At this point Ms. Joshi is expected to be discharge to: home    The 21st Century Cures Act makes medical notes like these available to patients in the interest of transparency. Please be advised this is a medical document. Medical documents are intended to carry relevant information, facts as evident, and the clinical opinion of the practitioner. The medical note is intended as peer to peer communication and may appear blunt or direct. It is written in medical language and may contain abbreviations or verbiage that are unfamiliar.      Dietitian Malnutrition Assessment          Evaluation for Malnutrition: Criteria for severe malnutrition diagnosis- chronic illness related to   Energy intake less than 75% for greater than 1 month., Body fat severe depletion., Muscle mass severe depletion.               RD Malnutrition Care Plan: See RD nutrition assessment for additional recommendations.    Body Fat/Muscle Mass:          Physician Assessment    Patient has a diagnosis of severe malnutrition

## 2024-08-25 ENCOUNTER — APPOINTMENT (OUTPATIENT)
Dept: ULTRASOUND IMAGING | Facility: HOSPITAL | Age: 89
End: 2024-08-25
Attending: INTERNAL MEDICINE
Payer: MEDICARE

## 2024-08-25 VITALS
TEMPERATURE: 98 F | HEART RATE: 88 BPM | DIASTOLIC BLOOD PRESSURE: 69 MMHG | RESPIRATION RATE: 18 BRPM | HEIGHT: 65 IN | WEIGHT: 127 LBS | BODY MASS INDEX: 21.16 KG/M2 | OXYGEN SATURATION: 94 % | SYSTOLIC BLOOD PRESSURE: 119 MMHG

## 2024-08-25 LAB
INR BLD: 1.05 (ref 0.8–1.2)
PROTHROMBIN TIME: 13.8 SECONDS (ref 11.6–14.8)

## 2024-08-25 PROCEDURE — 76705 ECHO EXAM OF ABDOMEN: CPT | Performed by: INTERNAL MEDICINE

## 2024-08-25 PROCEDURE — 99239 HOSP IP/OBS DSCHRG MGMT >30: CPT | Performed by: INTERNAL MEDICINE

## 2024-08-25 RX ORDER — FUROSEMIDE 20 MG
20 TABLET ORAL DAILY
Qty: 30 TABLET | Refills: 0 | Status: SHIPPED | OUTPATIENT
Start: 2024-08-25

## 2024-08-25 RX ORDER — OLANZAPINE 2.5 MG/1
2.5 TABLET, FILM COATED ORAL NIGHTLY
Qty: 30 TABLET | Refills: 0 | Status: SHIPPED | OUTPATIENT
Start: 2024-08-25 | End: 2024-08-25

## 2024-08-25 RX ORDER — PANTOPRAZOLE SODIUM 40 MG/1
40 TABLET, DELAYED RELEASE ORAL
Qty: 60 TABLET | Refills: 1 | Status: SHIPPED | OUTPATIENT
Start: 2024-08-25 | End: 2024-08-25

## 2024-08-25 RX ORDER — PANTOPRAZOLE SODIUM 40 MG/1
40 TABLET, DELAYED RELEASE ORAL
Qty: 60 TABLET | Refills: 1 | Status: SHIPPED | OUTPATIENT
Start: 2024-08-25

## 2024-08-25 RX ORDER — FUROSEMIDE 20 MG
20 TABLET ORAL DAILY
Qty: 30 TABLET | Refills: 0 | Status: SHIPPED | OUTPATIENT
Start: 2024-08-25 | End: 2024-08-25

## 2024-08-25 RX ORDER — OLANZAPINE 2.5 MG/1
2.5 TABLET, FILM COATED ORAL NIGHTLY
Qty: 30 TABLET | Refills: 0 | Status: SHIPPED | OUTPATIENT
Start: 2024-08-25

## 2024-08-25 NOTE — DISCHARGE SUMMARY
[facility]     Andree Joshi Patient Status:  Inpatient    3/1/1935 MRN [id number]   Location [facility] 4NW-A Attending Yo Doherty MD   Hosp Day # 7 PCP Gena Rivas     Date of Admission: 2024  Date of Discharge:   2024      Discharge Disposition: Final discharge disposition not confirmed    Discharge Diagnosis:  ***    History of Present Illness: ***    Brief Synopsis: ***    Lace+ Score: 71  59-90 High Risk  29-58 Medium Risk  0-28   Low Risk       TCM Follow-Up Recommendation:  {Care Managers will evaluate the need for follow-up for all patients ages 50+, and high/moderate risk patients ages 25-49. Low risk patients (LACE < 29) will only be evaluated if the \"Still recommend for TCM follow-up\" option is selected from this list.:[id number]}      Procedures during hospitalization:   ***    Incidental or significant findings and recommendations (brief descriptions):  ***    Lab/Test results pending at Discharge:   ***    Consultants:  ***    Discharge Medication List:     Discharge Medications        START taking these medications        Instructions Prescription details   furosemide 20 MG Tabs  Commonly known as: Lasix      Take 1 tablet (20 mg total) by mouth daily.   Quantity: 30 tablet  Refills: 0     OLANZapine 2.5 MG Tabs  Commonly known as: ZyPREXA      Take 1 tablet (2.5 mg total) by mouth at bedtime.   Quantity: 30 tablet  Refills: 0     pantoprazole 40 MG Tbec  Commonly known as: Protonix      Take 1 tablet (40 mg total) by mouth 2 (two) times daily before meals.   Quantity: 60 tablet  Refills: 1            CONTINUE taking these medications        Instructions Prescription details   folic acid 1 MG Tabs  Commonly known as: Folvite      Take by mouth daily.   Refills: 0     levothyroxine 100 MCG Tabs  Commonly known as: Synthroid      Take 1 tablet (100 mcg total) by mouth before breakfast.   Refills: 0               Where to Get Your Medications         These medications were sent to [facility]/pharmacy #[id number] - [place], [place] - [address] [phone], [phone] [address] [place] [address]      Phone: [phone]   furosemide 20 MG Tabs  OLANZapine 2.5 MG Tabs  pantoprazole 40 MG Tbec         [place] reviewed: ***    Follow-up appointment:   No follow-up provider specified.  Appointments for Next 30 Days 8/25/2024 - 9/24/2024      None            Vital signs:  Temp:  [97.4 °F (36.3 °C)-98.8 °F (37.1 °C)] 98.6 °F (37 °C)  Pulse:  [68-71] 71  Resp:  [16-18] 16  BP: (100-115)/(45-53) 100/53  SpO2:  [92 %-95 %] 93 %    Physical Exam:    General: No acute distress   Lungs: clear to auscultation  Cardiovascular: S1, S2  Abdomen: Soft  ***    -----------------------------------------------------------------------------------------------  PATIENT DISCHARGE INSTRUCTIONS: See electronic chart    Yo Doherty MD    Total time spent on discharge planning:  *** minutes     The 21st Century Cures Act makes medical notes like these available to patients in the interest of transparency. Please be advised this is a medical document. Medical documents are intended to carry relevant information, facts as evident, and the clinical opinion of the practitioner. The medical note is intended as peer to peer communication and may appear blunt or direct. It is written in medical language and may contain abbreviations or verbiage that are unfamiliar.

## 2024-08-25 NOTE — PLAN OF CARE
VSS. RA. 92% Spo2 with ambulation.  Poor appetite.         NURSING DISCHARGE NOTE    Discharged Home via Wheelchair.  Accompanied by Family member  Belongings Taken by patient/family.  Discharge teaching provided and verbalized understanding.     Problem: GASTROINTESTINAL - ADULT  Goal: Maintains adequate nutritional intake (undernourished)  Description: INTERVENTIONS:  - Monitor percentage of each meal consumed  - Identify factors contributing to decreased intake, treat as appropriate  - Assist with meals as needed  - Monitor I&O, WT and lab values  - Obtain nutritional consult as needed  - Optimize oral hygiene and moisture  - Encourage food from home; allow for food preferences  - Enhance eating environment  Outcome: Adequate for Discharge  Goal: Achieves appropriate nutritional intake (bariatric)  Description: INTERVENTIONS:  - Monitor for over-consumption  - Identify factors contributing to increased intake, treat as appropriate  - Monitor I&O, WT and lab values  - Obtain nutritional consult as needed  - Evaluate psychosocial factors contributing to over-consumption  Outcome: Adequate for Discharge

## 2024-08-25 NOTE — PROGRESS NOTES
[facility]   part of [facility]     Hospitalist Progress Note     Andree Joshi Patient Status:  Inpatient    3/1/1935 MRN [id number]   Location [facility] 4NW-A Attending Jole Jaime MD   Hosp Day # 7 PCP Gena Rivas     Chief Complaint: vomiting    Subjective:     Patient without acute events overnight. On RA this morning. Edema improving. Attempted paracentesis this morning but not enough fluid. Tolerating diet a little better.     Objective:    Review of Systems:   ROS completed; pertinent positive and negatives stated in subjective.    Vital signs:  Temp:  [97.4 °F (36.3 °C)-98.8 °F (37.1 °C)] 98.6 °F (37 °C)  Pulse:  [68-71] 71  Resp:  [16-18] 16  BP: (100-115)/(45-53) 100/53  SpO2:  [92 %-95 %] 93 %    Physical Exam:    General: No acute distress  Respiratory: Clear to auscultation bilaterally  Cardiovascular: S1, S2.  Abdomen: Soft, non tender  Neuro: No new focal deficits  Ext: trace edema      Diagnostic Data:    Labs:  Recent Labs   Lab 24  0615 24  1255 24  0805 24  1304 24  0729 24  0726   WBC 11.9* 12.2* 9.1 10.4 9.8  --    HGB 8.9* 8.3* 8.0* 10.1* 8.9*  --    MCV 93.3 100.4* 95.4 95.7 94.1  --    .0 339.0 337.0 452.0* 457.0*  --    INR  --   --   --   --   --  1.05       Recent Labs   Lab 24  0615 24  0805 24  0959 24  0700   * 103* 136* 106*   BUN 17 14 15 17   CREATSERUM 0.56 0.52* 0.58 0.57   CA 7.8* 7.8* 8.0* 8.1*   ALB 3.1* 2.9*  --   --    * 136 134* 134*   K 3.3* 3.6  3.6 3.1* 3.5  3.5    106 103 104   CO2 25.0 26.0 24.0 28.0   ALKPHO 548* 457*  --   --    AST 76* 65*  --   --    ALT 56* 48  --   --    BILT 0.3 0.3  --   --    TP 5.5* 5.2*  --   --        Estimated Creatinine Clearance: 61.4 mL/min (based on SCr of 0.57 mg/dL).     No results for input(s): \"TROP\", \"TROPHS\", \"CK\" in the last 168 hours.    Recent Labs   Lab 24  0726   PTP 13.8   INR 1.05              Imaging:  Imaging data reviewed in Epic.    Medications:    OLANZapine  2.5 mg Oral Nightly    pantoprazole  40 mg Oral BID AC    furosemide  20 mg Intravenous Daily    folic acid  1 mg Oral Daily    levothyroxine  100 mcg Oral Before breakfast       Assessment & Plan:     #Sepsis  BCX NTD  Minimize IVF given risk of fluid OL  Completed abx     #PNA  IV ABX  Plan on 5 days of therapy, completed     #Recent perforated duodenal ulcer/PUD  Cont. PPI BID  EGD with multiple large ulcers  Encourage oral intake today.   Olanzapine started    #Diarrhea  Resolved  Stool studies it re-occurs    #Anemia  IV iron    #Metastatic Stage IV BRCA  Follows with onc as OP  On palliative therapy  MRI brain neg for METS  PT OT eval today     #Pre DM with stress hyperglycemia  Likely stress/infection related  A1c 5.7     #Transaminase elevation  Due to liver mets  Monitor  Lasix for ascities, can transition to PO on DC  Plan for therapeutic paracentsis prior to DC     #Hypothyroid     #AOCD        Yo Doherty MD'      Supplementary Documentation:     Quality:    DVT Mechanical Prophylaxis:     Early ambuation  DVT Pharmacologic Prophylaxis   Medication   None                Code Status: DNAR/Selective Treatment  Mandujano: No urinary catheter in place  Mandujano Duration (in days):   Central line:    NURY: tmrw      Discharge is dependent on: clinical stability  At this point Ms. Joshi is expected to be discharge to: home    The 21st Century Cures Act makes medical notes like these available to patients in the interest of transparency. Please be advised this is a medical document. Medical documents are intended to carry relevant information, facts as evident, and the clinical opinion of the practitioner. The medical note is intended as peer to peer communication and may appear blunt or direct. It is written in medical language and may contain abbreviations or verbiage that are unfamiliar.     Dietitian Malnutrition Assessment          Evaluation  for Malnutrition: Criteria for severe malnutrition diagnosis- chronic illness related to   Energy intake less than 75% for greater than 1 month., Body fat severe depletion., Muscle mass severe depletion.               RD Malnutrition Care Plan: See RD nutrition assessment for additional recommendations.    Body Fat/Muscle Mass:          Physician Assessment    Patient has a diagnosis of severe malnutrition

## 2024-08-25 NOTE — PLAN OF CARE
Received pt a/o x4. VSS. Afebrile. Denies pain or nausea. Poor appetite continued. NPO at midnight for planned paracentesis. BLE edema improving. Call light within reach. Safety precautions in place.     Problem: GASTROINTESTINAL - ADULT  Goal: Maintains adequate nutritional intake (undernourished)  Description: INTERVENTIONS:  - Monitor percentage of each meal consumed  - Identify factors contributing to decreased intake, treat as appropriate  - Assist with meals as needed  - Monitor I&O, WT and lab values  - Obtain nutritional consult as needed  - Optimize oral hygiene and moisture  - Encourage food from home; allow for food preferences  - Enhance eating environment  Outcome: Progressing

## (undated) DEVICE — BITEBLOCK ENDOSCP 60FR MAXI STRP

## (undated) DEVICE — KIT MFLD FOR SPEC COLL

## (undated) DEVICE — 1200CC GUARDIAN II: Brand: GUARDIAN

## (undated) DEVICE — KIT VLV 5 PC AIR H2O SUCT BX ENDOGATOR CONN

## (undated) DEVICE — GIJAW SINGLE-USE BIOPSY FORCEPS WITH NEEDLE: Brand: GIJAW

## (undated) DEVICE — ADULT PROCEDURAL OXYGEN MASK  - MEDIUM CONCENTRATION AND CO2 MONITORING (MICROSTREAM COMPATIBLE): Brand: POM EZ-LITE MSA

## (undated) DEVICE — 3M™ RED DOT™ MONITORING ELECTRODE WITH FOAM TAPE AND STICKY GEL, 50/BAG, 20/CASE, 72/PLT 2570: Brand: RED DOT™

## (undated) DEVICE — 10FT COMBINED O2 DELIVERY/CO2 MONITORING. FILTER WITH MICROSTREAM TYPE LUER: Brand: DUAL ADULT NASAL CANNULA

## (undated) NOTE — LETTER
[address]  [address]  Authorization for Surgical Operation and Procedure     Date:___________                                                                                                         Time:__________  I hereby authorize Surgeon(s):  Nelson Perez MD, my physician and his/her assistants (if applicable), which may include medical students, residents, and/or fellows, to perform the following surgical operation/ procedure and administer such anesthesia as may be determined necessary by my physician:  Operation/Procedure name (s) Procedure(s):  [facility]AG[facility]   2.   I recognize that during the surgical operation/procedure, unforeseen conditions may necessitate additional or different procedures than those listed above.  I, therefore, further authorize and request that the above-named surgeon, assistants, or designees perform such procedures as are, in their judgment, necessary and desirable.    3.   My surgeon/physician has discussed prior to my surgery the potential benefits, risks and side effects of this procedure; the likelihood of achieving goals; and potential problems that might occur during recuperation.  They also discussed reasonable alternatives to the procedure, including risks, benefits, and side effects related to the alternatives and risks related to not receiving this procedure.  I have had all my questions answered and I acknowledge that no guarantee has been made as to the result that may be obtained.    4.   Should the need arise during my operation/procedure, which includes change of level of care prior to discharge, I also consent to the administration of blood and/or blood products.  Further, I understand that despite careful testing and screening of blood or blood products by collecting agencies, I may still be subject to ill effects as a result of receiving a blood transfusion and/or blood products.   The following are some, but not all, of the potential risks that can occur: fever and allergic reactions, hemolytic reactions, transmission of diseases such as Hepatitis, AIDS and Cytomegalovirus (CMV) and fluid overload.  In the event that I wish to have an autologous transfusion of my own blood, or a directed donor transfusion, I will discuss this with my physician.  Check only if Refusing Blood or Blood Products  I understand refusal of blood or blood products as deemed necessary by my physician may have serious consequences to my condition to include possible death. I hereby assume responsibility for my refusal and release the hospital, its personnel, and my physicians from any responsibility for the consequences of my refusal.          o  Refuse      5.   I authorize the use of any specimen, organs, tissues, body parts or foreign objects that may be removed from my body during the operation/procedure for diagnosis, research or teaching purposes and their subsequent disposal by hospital authorities.  I also authorize the release of specimen test results and/or written reports to my treating physician on the hospital medical staff or other referring or consulting physicians involved in my care, at the discretion of the Pathologist or my treating physician.    6.   I consent to the photographing or videotaping of the operations or procedures to be performed, including appropriate portions of my body for medical, scientific, or educational purposes, provided my identity is not revealed by the pictures or by descriptive texts accompanying them.  If the procedure has been photographed/videotaped, the surgeon will obtain the original picture, image, videotape or CD.  The hospital will not be responsible for storage, release or maintenance of the picture, image, tape or CD.    7.   I consent to the presence of a  or observers in the operating room as deemed necessary by my physician or their  designees.    8.   I recognize that in the event my procedure results in extended X-Ray/fluoroscopy time, I may develop a skin reaction.    9. If I have a Do Not Attempt Resuscitation (DNAR) order in place, that status will be suspended while in the operating room, procedural suite, and during the recovery period unless otherwise explicitly stated by me (or a person authorized to consent on my behalf). The surgeon or my attending physician will determine when the applicable recovery period ends for purposes of reinstating the DNAR order.  10. Patients having a sterilization procedure: I understand that if the procedure is successful the results will be permanent and it will therefore be impossible for me to inseminate, conceive, or bear children.  I also understand that the procedure is intended to result in sterility, although the result has not been guaranteed.   11. I acknowledge that my physician has explained sedation/analgesia administration to me including the risk and benefits I consent to the administration of sedation/analgesia as may be necessary or desirable in the judgment of my physician.    I CERTIFY THAT I HAVE READ AND FULLY UNDERSTAND THE ABOVE CONSENT TO OPERATION and/or OTHER PROCEDURE.    _________________________________________  __________________________________  Signature of Patient     Signature of Responsible Person         ___________________________________         Printed Name of Responsible Person           _________________________________                 Relationship to Patient  _________________________________________  ______________________________  Signature of Witness          Date  Time      Patient Name: Andree Joshi     : 3/1/1935                 Printed: 2024     Medical Record #: [id number]                     Page [address] of [address] [place] [address]  [id number]    Consent for Anesthesia    Andree ZAPIEN  Madelyn agree to be cared for by an anesthesiologist, who is specially trained to monitor me and give me medicine to put me to sleep or keep me comfortable during my procedure    I understand that my anesthesiologist is not an employee or agent of [facility] [employer]. He or she works for [employer].    As the patient asking for anesthesia services, I agree to:  Allow the anesthesiologist (anesthesia doctor) to give me medicine and do additional procedures as necessary. Some examples are: Starting or using an “IV” to give me medicine, fluids or blood during my procedure, and having a breathing tube placed to help me breathe when I’m asleep (intubation). In the event that my heart stops working properly, I understand that my anesthesiologist will make every effort to sustain my life, unless otherwise directed by [facility] Do Not Resuscitate documents.  Tell my anesthesia doctor before my procedure:  If I am pregnant.  The last time that I ate or drank.  All of the medicines I take (including prescriptions, herbal supplements, and pills I can buy without a prescription (including street drugs/illegal medications). Failure to inform my anesthesiologist about these medicines may increase my risk of anesthetic complications.  If I am allergic to anything or have had a reaction to anesthesia before.  I understand how the anesthesia medicine will help me (benefits).  I understand that with any type of anesthesia medicine there are risks:  The most common risks are: nausea, vomiting, sore throat, muscle soreness, damage to my eyes, mouth, or teeth (from breathing tube placement).  Rare risks include: remembering what happened during my procedure, allergic reactions to medications, injury to my airway, heart, lungs, vision, nerves, or muscles and in extremely rare instances death.  My doctor has explained to me other choices available to me for my care (alternatives).  Pregnant  Patients (“epidural”):  I understand that the risks of having an epidural (medicine given into my back to help control pain during labor), include itching, low blood pressure, difficulty urinating, headache or slowing of the baby’s heart. Very rare risks include infection, bleeding, seizure, irregular heart rhythms and nerve injury.  Regional Anesthesia (“spinal”, “epidural”, & “nerve blocks”):  I understand that rare but potential complications include headache, bleeding, infection, seizure, irregular heart rhythms, and nerve injury.    I can change my mind about having anesthesia services at any time before I get the medicine.    _____________________________________________________________________________  Patient (or Representative) Signature/Relationship to Patient  Date   Time    _____________________________________________________________________________   Name (if used)    Language/Organization   Time    _____________________________________________________________________________  Anesthesiologist Signature     Date   Time  I have discussed the procedure and information above with the patient (or patient’s representative) and answered their questions. The patient or their representative has agreed to have anesthesia services.    _____________________________________________________________________________  Witness        Date   Time  I have verified that the signature is that of the patient or patient’s representative, and that it was signed before the procedure  Patient Name: Andree Joshi     : 3/1/1935                 Printed: 2024     Medical Record #: [id number]                     Page 2 of 2